# Patient Record
Sex: FEMALE | Race: OTHER | HISPANIC OR LATINO | Employment: FULL TIME | URBAN - METROPOLITAN AREA
[De-identification: names, ages, dates, MRNs, and addresses within clinical notes are randomized per-mention and may not be internally consistent; named-entity substitution may affect disease eponyms.]

---

## 2017-01-31 ENCOUNTER — HOSPITAL ENCOUNTER (EMERGENCY)
Facility: HOSPITAL | Age: 27
Discharge: HOME | End: 2017-01-31
Attending: EMERGENCY MEDICINE | Admitting: EMERGENCY MEDICINE
Payer: COMMERCIAL

## 2017-01-31 ENCOUNTER — APPOINTMENT (EMERGENCY)
Dept: ULTRASOUND IMAGING | Facility: HOSPITAL | Age: 27
End: 2017-01-31
Payer: COMMERCIAL

## 2017-01-31 VITALS
SYSTOLIC BLOOD PRESSURE: 121 MMHG | DIASTOLIC BLOOD PRESSURE: 74 MMHG | RESPIRATION RATE: 16 BRPM | TEMPERATURE: 98.5 F | OXYGEN SATURATION: 98 % | WEIGHT: 142 LBS | HEART RATE: 78 BPM

## 2017-01-31 DIAGNOSIS — O26.899 ABDOMINAL PAIN IN PREGNANCY: Primary | ICD-10-CM

## 2017-01-31 DIAGNOSIS — R10.2 PELVIC PAIN: ICD-10-CM

## 2017-01-31 DIAGNOSIS — R10.9 ABDOMINAL PAIN IN PREGNANCY: Primary | ICD-10-CM

## 2017-01-31 LAB
B-HCG SERPL-ACNC: ABNORMAL MIU/ML
BILIRUB UR QL STRIP: NEGATIVE
CLARITY UR: CLEAR
COLOR UR: YELLOW
COLOR, POC: YELLOW
GLUCOSE UR STRIP-MCNC: NEGATIVE MG/DL
HCG UR QL: POSITIVE
HGB UR QL STRIP.AUTO: NEGATIVE
KETONES UR STRIP-MCNC: NEGATIVE MG/DL
LEUKOCYTE ESTERASE UR QL STRIP: NEGATIVE
NITRITE UR QL STRIP: NEGATIVE
PH UR STRIP.AUTO: 8.5 [PH] (ref 4.5–8)
PROT UR STRIP-MCNC: NEGATIVE MG/DL
SP GR UR STRIP.AUTO: 1.02 (ref 1–1.03)
UROBILINOGEN UR QL STRIP.AUTO: 0.2 E.U./DL

## 2017-01-31 PROCEDURE — 87491 CHLMYD TRACH DNA AMP PROBE: CPT | Performed by: EMERGENCY MEDICINE

## 2017-01-31 PROCEDURE — 87591 N.GONORRHOEAE DNA AMP PROB: CPT | Performed by: EMERGENCY MEDICINE

## 2017-01-31 PROCEDURE — 81025 URINE PREGNANCY TEST: CPT | Performed by: EMERGENCY MEDICINE

## 2017-01-31 PROCEDURE — 36415 COLL VENOUS BLD VENIPUNCTURE: CPT | Performed by: EMERGENCY MEDICINE

## 2017-01-31 PROCEDURE — 81002 URINALYSIS NONAUTO W/O SCOPE: CPT | Performed by: EMERGENCY MEDICINE

## 2017-01-31 PROCEDURE — 99284 EMERGENCY DEPT VISIT MOD MDM: CPT

## 2017-01-31 PROCEDURE — 84702 CHORIONIC GONADOTROPIN TEST: CPT | Performed by: EMERGENCY MEDICINE

## 2017-01-31 PROCEDURE — 81003 URINALYSIS AUTO W/O SCOPE: CPT

## 2017-01-31 PROCEDURE — 76801 OB US < 14 WKS SINGLE FETUS: CPT

## 2017-01-31 RX ORDER — LAMOTRIGINE 100 MG/1
200 TABLET ORAL DAILY
COMMUNITY

## 2017-02-01 LAB
CHLAMYDIA DNA CVX QL NAA+PROBE: NORMAL
N GONORRHOEA DNA GENITAL QL NAA+PROBE: NORMAL

## 2018-02-08 ENCOUNTER — HOSPITAL ENCOUNTER (EMERGENCY)
Facility: HOSPITAL | Age: 28
Discharge: LEFT BEFORE DISCHARGE | End: 2018-02-08
Attending: EMERGENCY MEDICINE

## 2018-02-08 ENCOUNTER — APPOINTMENT (EMERGENCY)
Dept: CT IMAGING | Facility: HOSPITAL | Age: 28
End: 2018-02-08

## 2018-02-08 VITALS
TEMPERATURE: 97.5 F | HEART RATE: 86 BPM | RESPIRATION RATE: 14 BRPM | DIASTOLIC BLOOD PRESSURE: 64 MMHG | OXYGEN SATURATION: 100 % | WEIGHT: 149.91 LBS | SYSTOLIC BLOOD PRESSURE: 103 MMHG

## 2018-02-08 DIAGNOSIS — Z86.73 HISTORY OF STROKE: ICD-10-CM

## 2018-02-08 DIAGNOSIS — G40.909 NONINTRACTABLE EPILEPSY WITHOUT STATUS EPILEPTICUS, UNSPECIFIED EPILEPSY TYPE (HCC): Primary | ICD-10-CM

## 2018-02-08 LAB
ALBUMIN SERPL BCP-MCNC: 4 G/DL (ref 3.5–5)
ALP SERPL-CCNC: 57 U/L (ref 46–116)
ALT SERPL W P-5'-P-CCNC: 29 U/L (ref 12–78)
AMPHETAMINES SERPL QL SCN: NEGATIVE
ANION GAP SERPL CALCULATED.3IONS-SCNC: 21 MMOL/L (ref 4–13)
AST SERPL W P-5'-P-CCNC: 16 U/L (ref 5–45)
BACTERIA UR QL AUTO: ABNORMAL /HPF
BARBITURATES UR QL: NEGATIVE
BASOPHILS # BLD AUTO: 0.02 THOUSANDS/ΜL (ref 0–0.1)
BASOPHILS NFR BLD AUTO: 0 % (ref 0–1)
BENZODIAZ UR QL: NEGATIVE
BILIRUB SERPL-MCNC: 0.17 MG/DL (ref 0.2–1)
BILIRUB UR QL STRIP: NEGATIVE
BUN SERPL-MCNC: 15 MG/DL (ref 5–25)
CALCIUM SERPL-MCNC: 8.9 MG/DL (ref 8.3–10.1)
CHLORIDE SERPL-SCNC: 102 MMOL/L (ref 100–108)
CLARITY UR: CLEAR
CO2 SERPL-SCNC: 16 MMOL/L (ref 21–32)
COCAINE UR QL: NEGATIVE
COLOR UR: YELLOW
CREAT SERPL-MCNC: 0.95 MG/DL (ref 0.6–1.3)
EOSINOPHIL # BLD AUTO: 0.13 THOUSAND/ΜL (ref 0–0.61)
EOSINOPHIL NFR BLD AUTO: 2 % (ref 0–6)
ERYTHROCYTE [DISTWIDTH] IN BLOOD BY AUTOMATED COUNT: 14.1 % (ref 11.6–15.1)
EXT PREG TEST URINE: NEGATIVE
GFR SERPL CREATININE-BSD FRML MDRD: 83 ML/MIN/1.73SQ M
GLUCOSE SERPL-MCNC: 98 MG/DL (ref 65–140)
GLUCOSE UR STRIP-MCNC: NEGATIVE MG/DL
HCT VFR BLD AUTO: 39.3 % (ref 34.8–46.1)
HGB BLD-MCNC: 13 G/DL (ref 11.5–15.4)
HGB UR QL STRIP.AUTO: ABNORMAL
KETONES UR STRIP-MCNC: NEGATIVE MG/DL
LEUKOCYTE ESTERASE UR QL STRIP: ABNORMAL
LYMPHOCYTES # BLD AUTO: 2.5 THOUSANDS/ΜL (ref 0.6–4.47)
LYMPHOCYTES NFR BLD AUTO: 29 % (ref 14–44)
MCH RBC QN AUTO: 29.1 PG (ref 26.8–34.3)
MCHC RBC AUTO-ENTMCNC: 33.1 G/DL (ref 31.4–37.4)
MCV RBC AUTO: 88 FL (ref 82–98)
METHADONE UR QL: NEGATIVE
MONOCYTES # BLD AUTO: 0.48 THOUSAND/ΜL (ref 0.17–1.22)
MONOCYTES NFR BLD AUTO: 6 % (ref 4–12)
NEUTROPHILS # BLD AUTO: 5.57 THOUSANDS/ΜL (ref 1.85–7.62)
NEUTS SEG NFR BLD AUTO: 63 % (ref 43–75)
NITRITE UR QL STRIP: NEGATIVE
NON-SQ EPI CELLS URNS QL MICRO: ABNORMAL /HPF
NRBC BLD AUTO-RTO: 0 /100 WBCS
OPIATES UR QL SCN: NEGATIVE
PCP UR QL: NEGATIVE
PH UR STRIP.AUTO: 5.5 [PH] (ref 4.5–8)
PLATELET # BLD AUTO: 268 THOUSANDS/UL (ref 149–390)
PMV BLD AUTO: 12.1 FL (ref 8.9–12.7)
POTASSIUM SERPL-SCNC: 3.8 MMOL/L (ref 3.5–5.3)
PROT SERPL-MCNC: 8.2 G/DL (ref 6.4–8.2)
PROT UR STRIP-MCNC: ABNORMAL MG/DL
RBC # BLD AUTO: 4.47 MILLION/UL (ref 3.81–5.12)
RBC #/AREA URNS AUTO: ABNORMAL /HPF
SODIUM SERPL-SCNC: 139 MMOL/L (ref 136–145)
SP GR UR STRIP.AUTO: >=1.03 (ref 1–1.03)
THC UR QL: NEGATIVE
UROBILINOGEN UR QL STRIP.AUTO: 0.2 E.U./DL
WBC # BLD AUTO: 8.7 THOUSAND/UL (ref 4.31–10.16)
WBC #/AREA URNS AUTO: ABNORMAL /HPF

## 2018-02-08 PROCEDURE — 80053 COMPREHEN METABOLIC PANEL: CPT | Performed by: NURSE PRACTITIONER

## 2018-02-08 PROCEDURE — 81001 URINALYSIS AUTO W/SCOPE: CPT

## 2018-02-08 PROCEDURE — 81025 URINE PREGNANCY TEST: CPT | Performed by: NURSE PRACTITIONER

## 2018-02-08 PROCEDURE — 80175 DRUG SCREEN QUAN LAMOTRIGINE: CPT | Performed by: NURSE PRACTITIONER

## 2018-02-08 PROCEDURE — 81002 URINALYSIS NONAUTO W/O SCOPE: CPT | Performed by: NURSE PRACTITIONER

## 2018-02-08 PROCEDURE — 36415 COLL VENOUS BLD VENIPUNCTURE: CPT | Performed by: NURSE PRACTITIONER

## 2018-02-08 PROCEDURE — 96360 HYDRATION IV INFUSION INIT: CPT

## 2018-02-08 PROCEDURE — 70450 CT HEAD/BRAIN W/O DYE: CPT

## 2018-02-08 PROCEDURE — 85025 COMPLETE CBC W/AUTO DIFF WBC: CPT | Performed by: NURSE PRACTITIONER

## 2018-02-08 PROCEDURE — 99284 EMERGENCY DEPT VISIT MOD MDM: CPT

## 2018-02-08 PROCEDURE — 80307 DRUG TEST PRSMV CHEM ANLYZR: CPT | Performed by: NURSE PRACTITIONER

## 2018-02-08 RX ADMIN — SODIUM CHLORIDE 1000 ML: 0.9 INJECTION, SOLUTION INTRAVENOUS at 02:50

## 2018-02-08 NOTE — ED NOTES
Patient unable to provide urine sample, aware one is needed, instructed to call when able to provide sample     Otilia Rowan RN  02/08/18 4071

## 2018-02-08 NOTE — ED NOTES
Daniela JOHNS at bedside explaining risk of leaving AMA  Patient verbalized understanding and signed paperwork  Paperwork on patient chart       Ayanna Browning RN  02/08/18 3372

## 2018-02-08 NOTE — ED NOTES
Suction setup at bedside  Patient with HOB elevated at 45 degrees       Kristina Augusta, TOI  02/08/18 9042

## 2018-02-08 NOTE — ED PROVIDER NOTES
History  Chief Complaint   Patient presents with    Seizure - Prior Hx Of     Pt reports " I think I had seizure"  Per EMS family called due to patient "shaking", pt vomited PTA  Pt reports last seizure was in June      This is a 33year old female who has a hx of epilepsy since age 16 and a subacute right maribel stroke and takes lamictal  She states she sees a neurologist at [address] and hasn't had a lamictal level in a long time  She admits to a headache  Pt admits that she was to take aspirin for life but stopped it 1 month ago  She states that her doctor is aware of this  She states she was sleeping and states "I think I had a convulsion"  Pt states she was sleeping and apparently had a seizure, her mother in law was in the house and may have been the one to call EMS  EMS states that pt had vomited when they arrived  EMS stated that pt was post ictal upon arrival  Pt denies incontinence or tongue biting  LMP now  Pt states she does drive  Hx of:   Acute ischemic stroke (HCC)    Basilar artery stenosis    Cervicalgia          History provided by:  Patient and medical records  History limited by:  Acuity of condition   used: Yes    Seizure - Prior Hx Of   Seizure activity on arrival: no    Seizure type:  Unable to specify  Postictal symptoms: confusion, memory loss and somnolence    Severity:  Mild  Duration:  1 hour  Progression:  Unable to specify  History of seizures: yes        Prior to Admission Medications   Prescriptions Last Dose Informant Patient Reported? Taking?   lamoTRIgine (LaMICtal) 100 mg tablet   Yes No   Sig: Take 100 mg by mouth daily      Facility-Administered Medications: None       Past Medical History:   Diagnosis Date    Epilepsy ([facility] )     Myasthenia gravis ([facility] )        Past Surgical History:   Procedure Laterality Date    TOTAL THYMECTOMY         History reviewed  No pertinent family history    I have reviewed and agree with the history as documented  Social History   Substance Use Topics    Smoking status: Never Smoker    Smokeless tobacco: Never Used    Alcohol use No        Review of Systems   Unable to perform ROS: Acuity of condition       Physical Exam  ED Triage Vitals [02/08/18 0215]   Temperature Pulse Respirations Blood Pressure SpO2   97 5 °F (36 4 °C) 103 14 112/69 98 %      Temp Source Heart Rate Source Patient Position - Orthostatic VS BP Location FiO2 (%)   Oral Monitor Lying Right arm --      Pain Score       9           Orthostatic Vital Signs  Vitals:    02/08/18 0230 02/08/18 0245 02/08/18 0300 02/08/18 0330   BP:   97/61 103/64   Pulse: 98 92 92 86   Patient Position - Orthostatic VS:           Physical Exam   Constitutional: She is oriented to person, place, and time  She appears well-developed and well-nourished  No distress  Pt is very somnolent    HENT:   Head: Normocephalic and atraumatic  Right Ear: External ear normal    Left Ear: External ear normal    Nose: Nose normal    Mouth/Throat: Oropharynx is clear and moist  No oropharyngeal exudate  Eyes: EOM are normal  Pupils are equal, round, and reactive to light  4mm B/L    Neck: Normal range of motion  Neck supple  Cardiovascular: Normal rate, regular rhythm and normal heart sounds  Pulmonary/Chest: Effort normal and breath sounds normal    Abdominal: Soft  Bowel sounds are normal  There is tenderness  Musculoskeletal: Normal range of motion  Neurological: She is alert and oriented to person, place, and time  She displays normal reflexes  No cranial nerve deficit or sensory deficit  She exhibits normal muscle tone  Coordination normal    Skin: Skin is warm and dry  Capillary refill takes less than 2 seconds  She is not diaphoretic  Psychiatric: She has a normal mood and affect  Her behavior is normal  Judgment and thought content normal    Nursing note and vitals reviewed        ED Medications  Medications   sodium chloride 0 9 % bolus 1,000 mL (0 mL Intravenous Stopped 2/8/18 0403)       Diagnostic Studies  Results Reviewed     Procedure Component Value Units Date/Time    Rapid drug screen, urine [[id number]]  (Normal) Collected:  02/08/18 0331    Lab Status:  Final result Specimen:  Urine from Urine, Clean Catch Updated:  02/08/18 0349     Amph/Meth UR Negative     Barbiturate Ur Negative     Benzodiazepine Urine Negative     Cocaine Urine Negative     Methadone Urine Negative     Opiate Urine Negative     PCP Ur Negative     THC Urine Negative    Narrative:         FOR MEDICAL PURPOSES ONLY  IF CONFIRMATION NEEDED PLEASE CONTACT THE LAB WITHIN 5 DAYS      Drug Screen Cutoff Levels:  AMPHETAMINE/METHAMPHETAMINES  1000 ng/mL  BARBITURATES     200 ng/mL  BENZODIAZEPINES     200 ng/mL  COCAINE      300 ng/mL  METHADONE      300 ng/mL  OPIATES      300 ng/mL  PHENCYCLIDINE     25 ng/mL  THC       50 ng/mL    Urine Microscopic [[id number]]  (Abnormal) Collected:  02/08/18 0329    Lab Status:  Final result Specimen:  Urine from Urine, Clean Catch Updated:  02/08/18 0344     RBC, UA 4-10 (A) /hpf      WBC, UA 2-4 (A) /hpf      Epithelial Cells Occasional /hpf      Bacteria, UA Occasional /hpf     POCT urinalysis dipstick [[id number]]  (Abnormal) Resulted:  02/08/18 0332    Lab Status:  Final result Specimen:  Urine Updated:  02/08/18 0332    POCT pregnancy, urine [[id number]]  (Normal) Resulted:  02/08/18 0332    Lab Status:  Final result Updated:  02/08/18 0332     EXT PREG TEST UR (Ref: Negative) negative    ED Urine Macroscopic [[id number]]  (Abnormal) Collected:  02/08/18 0329    Lab Status:  Final result Specimen:  Urine Updated:  02/08/18 0329     Color, UA Yellow     Clarity, UA Clear     pH, UA 5 5     Leukocytes, UA Trace (A)     Nitrite, UA Negative     Protein, UA 30 (1+) (A) mg/dl      Glucose, UA Negative mg/dl      Ketones, UA Negative mg/dl      Urobilinogen, UA 0 2 E U /dl      Bilirubin, UA Negative     Blood, UA Large (A)     Specific Gravity, UA >=1 030 Narrative:       CLINITEK RESULT    Comprehensive metabolic panel [[id number]]  (Abnormal) Collected:  02/08/18 0243    Lab Status:  Final result Specimen:  Blood from Arm, Left Updated:  02/08/18 0303     Sodium 139 mmol/L      Potassium 3 8 mmol/L      Chloride 102 mmol/L      CO2 16 (L) mmol/L      Anion Gap 21 (H) mmol/L      BUN 15 mg/dL      Creatinine 0 95 mg/dL      Glucose 98 mg/dL      Calcium 8 9 mg/dL      AST 16 U/L      ALT 29 U/L      Alkaline Phosphatase 57 U/L      Total Protein 8 2 g/dL      Albumin 4 0 g/dL      Total Bilirubin 0 17 (L) mg/dL      eGFR 83 ml/min/1 73sq m     Narrative:         National Kidney Disease Education Program recommendations are as follows:  GFR calculation is accurate only with a steady state creatinine  Chronic Kidney disease less than 60 ml/min/1 73 sq  meters  Kidney failure less than 15 ml/min/1 73 sq  meters  CBC and differential [[id number]]  (Normal) Collected:  02/08/18 0243    Lab Status:  Final result Specimen:  Blood from Arm, Left Updated:  02/08/18 0248     WBC 8 70 Thousand/uL      RBC 4 47 Million/uL      Hemoglobin 13 0 g/dL      Hematocrit 39 3 %      MCV 88 fL      MCH 29 1 pg      MCHC 33 1 g/dL      RDW 14 1 %      MPV 12 1 fL      Platelets 539 Thousands/uL      nRBC 0 /100 WBCs      Neutrophils Relative 63 %      Lymphocytes Relative 29 %      Monocytes Relative 6 %      Eosinophils Relative 2 %      Basophils Relative 0 %      Neutrophils Absolute 5 57 Thousands/µL      Lymphocytes Absolute 2 50 Thousands/µL      Monocytes Absolute 0 48 Thousand/µL      Eosinophils Absolute 0 13 Thousand/µL      Basophils Absolute 0 02 Thousands/µL     Lamotrigine level [[id number]] Collected:  02/08/18 0243    Lab Status: In process Specimen:  Blood from Arm, Left Updated:  02/08/18 0245                 CT head without contrast   Final Result by Lamon Curling, MD (02/08 0255)      No acute intracranial hemorrhage, midline shift, or mass effect           Workstation performed: [id number]                    Procedures  Procedures       Phone Contacts  ED Phone Contact    ED Course  ED Course as of Feb 08 0405   u Feb 08, 2018   0306 Labs reviewed and unremarkable  CT head negative  I believe that pt warrants and MRI of the head to rule out any further CVA due to stopping aspirin 1 month ago and now a seizure as last one was in June 2017 0317 Waiting on urine Pt has had no further seizures while in ED      0354 I have discussed all labs and radiology results with pt  I have informed her that she would benefit from an MRI as last stroke was found on MRI and being that she has stopped aspirin she should have further testing  Pt is refusing to stay for MRI  I have also offered pt to be transferred to [facility] for further care since neurologist is there and she refuses  Pt signs out AMA  I have explained to pt that she could have a stroke, debilitating physical condition, neuro deficits or death  Pt verbalizes understanding                                   MDM  Number of Diagnoses or Management Options  Diagnosis management comments: Differential diagnosis:  Epilepsy history - seizure    Labs  uds  ua hcg  ua  IVF  CT head            Amount and/or Complexity of Data Reviewed  Clinical lab tests: ordered and reviewed  Tests in the radiology section of CPT®: ordered and reviewed  Review and summarize past medical records: yes      CritCare Time    Disposition  Final diagnoses:   Nonintractable epilepsy without status epilepticus, unspecified epilepsy type ([facility] )   History of stroke     Time reflects when diagnosis was documented in both MDM as applicable and the Disposition within this note     Time User Action Codes Description Comment    2/8/2018  4:01 AM Kvng Davison Add [G40 909] Nonintractable epilepsy without status epilepticus, unspecified epilepsy type ([facility] )     2/8/2018  4:02 AM Kvng Davison Add [Z86 73] History of stroke       ED Disposition     ED Disposition Condition Comment    AMA    Pt left AMA  All risk factors have been discussed with pt (see notes)  Pt signed AMA form          Follow-up Information     Follow up With Specialties Details Why Contact Info Additional Information       Follow up with your PCP at [facility]         Follow up with your Neurologist at [place] [address] Emergency Department Emergency Medicine  If symptoms worsen [address] [facility]  [phone] [place] ED, [address] [facility] [address]  , [place], [place], [address]        Patient's Medications   Discharge Prescriptions    No medications on file     No discharge procedures on file      ED Provider  Electronically Signed by           Kristi Bethea  02/08/18 8432

## 2018-02-11 LAB — LAMOTRIGINE SERPL-MCNC: 1.7 UG/ML (ref 2–20)

## 2019-08-27 ENCOUNTER — HOSPITAL ENCOUNTER (EMERGENCY)
Facility: HOSPITAL | Age: 29
Discharge: HOME | End: 2019-08-27
Attending: EMERGENCY MEDICINE | Admitting: EMERGENCY MEDICINE

## 2019-08-27 ENCOUNTER — OFFICE VISIT (OUTPATIENT)
Dept: OBGYN CLINIC | Facility: MEDICAL CENTER | Age: 29
End: 2019-08-27

## 2019-08-27 ENCOUNTER — APPOINTMENT (EMERGENCY)
Dept: RADIOLOGY | Facility: HOSPITAL | Age: 29
End: 2019-08-27

## 2019-08-27 VITALS
WEIGHT: 149 LBS | HEIGHT: 64 IN | BODY MASS INDEX: 25.44 KG/M2 | DIASTOLIC BLOOD PRESSURE: 70 MMHG | SYSTOLIC BLOOD PRESSURE: 111 MMHG | HEART RATE: 69 BPM

## 2019-08-27 VITALS
DIASTOLIC BLOOD PRESSURE: 71 MMHG | WEIGHT: 150.13 LBS | OXYGEN SATURATION: 99 % | RESPIRATION RATE: 17 BRPM | HEART RATE: 87 BPM | TEMPERATURE: 97.9 F | SYSTOLIC BLOOD PRESSURE: 113 MMHG

## 2019-08-27 DIAGNOSIS — R56.9 SEIZURE (HCC): ICD-10-CM

## 2019-08-27 DIAGNOSIS — S42.102A CLOSED FRACTURE OF LEFT SCAPULA, UNSPECIFIED PART OF SCAPULA, INITIAL ENCOUNTER: Primary | ICD-10-CM

## 2019-08-27 DIAGNOSIS — S42.102A LEFT SCAPULA FRACTURE: Primary | ICD-10-CM

## 2019-08-27 LAB
ALBUMIN SERPL BCP-MCNC: 3.6 G/DL (ref 3.5–5)
ALP SERPL-CCNC: 46 U/L (ref 46–116)
ALT SERPL W P-5'-P-CCNC: 16 U/L (ref 12–78)
ANION GAP SERPL CALCULATED.3IONS-SCNC: 11 MMOL/L (ref 4–13)
AST SERPL W P-5'-P-CCNC: 17 U/L (ref 5–45)
BASOPHILS # BLD AUTO: 0.02 THOUSANDS/ΜL (ref 0–0.1)
BASOPHILS NFR BLD AUTO: 0 % (ref 0–1)
BILIRUB SERPL-MCNC: <0.1 MG/DL (ref 0.2–1)
BILIRUB UR QL STRIP: NEGATIVE
BUN SERPL-MCNC: 15 MG/DL (ref 5–25)
CALCIUM SERPL-MCNC: 8.9 MG/DL (ref 8.3–10.1)
CHLORIDE SERPL-SCNC: 104 MMOL/L (ref 100–108)
CLARITY UR: NORMAL
CO2 SERPL-SCNC: 23 MMOL/L (ref 21–32)
COLOR UR: YELLOW
CREAT SERPL-MCNC: 0.8 MG/DL (ref 0.6–1.3)
EOSINOPHIL # BLD AUTO: 0.16 THOUSAND/ΜL (ref 0–0.61)
EOSINOPHIL NFR BLD AUTO: 3 % (ref 0–6)
ERYTHROCYTE [DISTWIDTH] IN BLOOD BY AUTOMATED COUNT: 12.5 % (ref 11.6–15.1)
EXT PREG TEST URINE: NORMAL
EXT. CONTROL ED NAV: NORMAL
GFR SERPL CREATININE-BSD FRML MDRD: 101 ML/MIN/1.73SQ M
GLUCOSE SERPL-MCNC: 84 MG/DL (ref 65–140)
GLUCOSE UR STRIP-MCNC: NEGATIVE MG/DL
HCT VFR BLD AUTO: 39.8 % (ref 34.8–46.1)
HGB BLD-MCNC: 12.7 G/DL (ref 11.5–15.4)
HGB UR QL STRIP.AUTO: NEGATIVE
IMM GRANULOCYTES # BLD AUTO: 0.02 THOUSAND/UL (ref 0–0.2)
IMM GRANULOCYTES NFR BLD AUTO: 0 % (ref 0–2)
KETONES UR STRIP-MCNC: NEGATIVE MG/DL
LEUKOCYTE ESTERASE UR QL STRIP: NEGATIVE
LYMPHOCYTES # BLD AUTO: 1.51 THOUSANDS/ΜL (ref 0.6–4.47)
LYMPHOCYTES NFR BLD AUTO: 28 % (ref 14–44)
MCH RBC QN AUTO: 29.6 PG (ref 26.8–34.3)
MCHC RBC AUTO-ENTMCNC: 31.9 G/DL (ref 31.4–37.4)
MCV RBC AUTO: 93 FL (ref 82–98)
MONOCYTES # BLD AUTO: 0.45 THOUSAND/ΜL (ref 0.17–1.22)
MONOCYTES NFR BLD AUTO: 8 % (ref 4–12)
NEUTROPHILS # BLD AUTO: 3.3 THOUSANDS/ΜL (ref 1.85–7.62)
NEUTS SEG NFR BLD AUTO: 61 % (ref 43–75)
NITRITE UR QL STRIP: NEGATIVE
NRBC BLD AUTO-RTO: 0 /100 WBCS
PH UR STRIP.AUTO: 6 [PH]
PLATELET # BLD AUTO: 210 THOUSANDS/UL (ref 149–390)
PMV BLD AUTO: 12.2 FL (ref 8.9–12.7)
POTASSIUM SERPL-SCNC: 3.9 MMOL/L (ref 3.5–5.3)
PROT SERPL-MCNC: 7.9 G/DL (ref 6.4–8.2)
PROT UR STRIP-MCNC: NEGATIVE MG/DL
RBC # BLD AUTO: 4.29 MILLION/UL (ref 3.81–5.12)
SODIUM SERPL-SCNC: 138 MMOL/L (ref 136–145)
SP GR UR STRIP.AUTO: >=1.03 (ref 1–1.03)
UROBILINOGEN UR QL STRIP.AUTO: 0.2 E.U./DL
WBC # BLD AUTO: 5.46 THOUSAND/UL (ref 4.31–10.16)

## 2019-08-27 PROCEDURE — 99203 OFFICE O/P NEW LOW 30 MIN: CPT | Performed by: ORTHOPAEDIC SURGERY

## 2019-08-27 PROCEDURE — 81025 URINE PREGNANCY TEST: CPT | Performed by: EMERGENCY MEDICINE

## 2019-08-27 PROCEDURE — 96374 THER/PROPH/DIAG INJ IV PUSH: CPT

## 2019-08-27 PROCEDURE — 80053 COMPREHEN METABOLIC PANEL: CPT | Performed by: EMERGENCY MEDICINE

## 2019-08-27 PROCEDURE — 99284 EMERGENCY DEPT VISIT MOD MDM: CPT

## 2019-08-27 PROCEDURE — 96361 HYDRATE IV INFUSION ADD-ON: CPT

## 2019-08-27 PROCEDURE — 81003 URINALYSIS AUTO W/O SCOPE: CPT | Performed by: EMERGENCY MEDICINE

## 2019-08-27 PROCEDURE — 73030 X-RAY EXAM OF SHOULDER: CPT

## 2019-08-27 PROCEDURE — 85025 COMPLETE CBC W/AUTO DIFF WBC: CPT | Performed by: EMERGENCY MEDICINE

## 2019-08-27 PROCEDURE — 80175 DRUG SCREEN QUAN LAMOTRIGINE: CPT | Performed by: EMERGENCY MEDICINE

## 2019-08-27 PROCEDURE — 99284 EMERGENCY DEPT VISIT MOD MDM: CPT | Performed by: EMERGENCY MEDICINE

## 2019-08-27 PROCEDURE — 36415 COLL VENOUS BLD VENIPUNCTURE: CPT | Performed by: EMERGENCY MEDICINE

## 2019-08-27 RX ORDER — KETOROLAC TROMETHAMINE 30 MG/ML
30 INJECTION, SOLUTION INTRAMUSCULAR; INTRAVENOUS ONCE
Status: COMPLETED | OUTPATIENT
Start: 2019-08-27 | End: 2019-08-27

## 2019-08-27 RX ORDER — TRAMADOL HYDROCHLORIDE 50 MG/1
50 TABLET ORAL EVERY 6 HOURS PRN
Qty: 20 TABLET | Refills: 0 | Status: SHIPPED | OUTPATIENT
Start: 2019-08-27 | End: 2019-09-01

## 2019-08-27 RX ORDER — ACETAMINOPHEN 325 MG/1
650 TABLET ORAL ONCE
Status: COMPLETED | OUTPATIENT
Start: 2019-08-27 | End: 2019-08-27

## 2019-08-27 RX ORDER — NAPROXEN 500 MG/1
500 TABLET ORAL 2 TIMES DAILY PRN
Qty: 20 TABLET | Refills: 0 | OUTPATIENT
Start: 2019-08-27 | End: 2020-07-28

## 2019-08-27 RX ADMIN — ACETAMINOPHEN 650 MG: 325 TABLET ORAL at 01:02

## 2019-08-27 RX ADMIN — SODIUM CHLORIDE 1000 ML: 0.9 INJECTION, SOLUTION INTRAVENOUS at 00:57

## 2019-08-27 RX ADMIN — KETOROLAC TROMETHAMINE 30 MG: 30 INJECTION, SOLUTION INTRAMUSCULAR at 01:03

## 2019-08-27 NOTE — ED PROVIDER NOTES
History  Chief Complaint   Patient presents with    Seizure - Prior Hx Of     pt had a seizure and her arm was under her body while seizing   Pt reports L arm pain and headache  31 yo female with hx of seizures who is compliant with her lamictal 200mg QD and tonight while sleeping had witnessed tonic clonic seizure for a minute and was post ictal, 911 called  Pt c/o mild headache and L shoulder pain "I was laying on it during the seizure"  History provided by:  Patient and EMS personnel   used: No    Seizure - Prior Hx Of   Seizure activity on arrival: no    Seizure type:  Grand mal  Preceding symptoms comment:  None - was sleeping  Initial focality:  None  Episode characteristics: generalized shaking    Return to baseline: yes    Severity:  Moderate  Timing:  Once  Number of seizures this episode:  1  Progression:  Resolved  Recent head injury:  No recent head injuries  PTA treatment:  None  History of seizures: yes        Prior to Admission Medications   Prescriptions Last Dose Informant Patient Reported? Taking?   lamoTRIgine (LaMICtal) 100 mg tablet   Yes No   Sig: Take 200 mg by mouth daily       Facility-Administered Medications: None       Past Medical History:   Diagnosis Date    Epilepsy ([facility] )     Myasthenia gravis ([facility] )        Past Surgical History:   Procedure Laterality Date    TOTAL THYMECTOMY         History reviewed  No pertinent family history  I have reviewed and agree with the history as documented  Social History     Tobacco Use    Smoking status: Never Smoker    Smokeless tobacco: Never Used   Substance Use Topics    Alcohol use: No    Drug use: No        Review of Systems   Constitutional: Negative for appetite change, chills, fatigue and fever  HENT: Negative for sore throat  Eyes: Negative for visual disturbance  Respiratory: Negative for shortness of breath  Cardiovascular: Negative for chest pain     Gastrointestinal: Negative for abdominal pain, diarrhea, nausea and vomiting  Genitourinary: Negative for dysuria, frequency, vaginal bleeding and vaginal discharge  Musculoskeletal: Positive for arthralgias (L post shoulder)  Negative for back pain, neck pain and neck stiffness  Skin: Negative for pallor and rash  Allergic/Immunologic: Negative for immunocompromised state  Neurological: Positive for seizures  Negative for dizziness, tremors, syncope, facial asymmetry, speech difficulty, light-headedness, numbness and headaches  Psychiatric/Behavioral: Negative for confusion  All other systems reviewed and are negative  Physical Exam  Physical Exam   Constitutional: She is oriented to person, place, and time  She appears well-developed and well-nourished  She appears distressed (uncomfortable)  HENT:   Head: Normocephalic and atraumatic  Right Ear: External ear normal    Left Ear: External ear normal    Mouth/Throat: Oropharynx is clear and moist    Eyes: Pupils are equal, round, and reactive to light  Conjunctivae and EOM are normal    Neck: Normal range of motion  Neck supple  Cardiovascular: Normal rate and regular rhythm  No murmur heard  Pulmonary/Chest: Effort normal and breath sounds normal  No respiratory distress  Abdominal: Soft  Bowel sounds are normal    Musculoskeletal: She exhibits tenderness (L post shoulder pain, decreased ROM due to pain - NV intact distally)  Neurological: She is alert and oriented to person, place, and time  She displays normal reflexes  She exhibits normal muscle tone  Coordination normal    Skin: Skin is warm  No rash noted  No pallor  Psychiatric: She has a normal mood and affect  Her behavior is normal    Nursing note and vitals reviewed        Vital Signs  ED Triage Vitals [08/27/19 0036]   Temperature Pulse Respirations Blood Pressure SpO2   97 9 °F (36 6 °C) 87 17 113/71 99 %      Temp Source Heart Rate Source Patient Position - Orthostatic VS BP Location FiO2 (%)   Oral Monitor Lying Right arm --      Pain Score       Worst Possible Pain           Vitals:    08/27/19 0036   BP: 113/71   Pulse: 87   Patient Position - Orthostatic VS: Lying         Visual Acuity  Visual Acuity      Most Recent Value   L Pupil Size (mm)  3   R Pupil Size (mm)  3          ED Medications  Medications   sodium chloride 0 9 % bolus 1,000 mL (0 mL Intravenous Stopped 8/27/19 0200)   ketorolac (TORADOL) injection 30 mg (30 mg Intravenous Given 8/27/19 0103)   acetaminophen (TYLENOL) tablet 650 mg (650 mg Oral Given 8/27/19 0102)       Diagnostic Studies  Results Reviewed     Procedure Component Value Units Date/Time    Comprehensive metabolic panel [[id number]]  (Abnormal) Collected:  08/27/19 0056    Lab Status:  Final result Specimen:  Blood from Arm, Right Updated:  08/27/19 0134     Sodium 138 mmol/L      Potassium 3 9 mmol/L      Chloride 104 mmol/L      CO2 23 mmol/L      ANION GAP 11 mmol/L      BUN 15 mg/dL      Creatinine 0 80 mg/dL      Glucose 84 mg/dL      Calcium 8 9 mg/dL      AST 17 U/L      ALT 16 U/L      Alkaline Phosphatase 46 U/L      Total Protein 7 9 g/dL      Albumin 3 6 g/dL      Total Bilirubin <0 10 mg/dL      eGFR 101 ml/min/1 73sq m     Narrative:       [place] guidelines for Chronic Kidney Disease (CKD):     Stage 1 with normal or high GFR (GFR > 90 mL/min/1 73 square meters)    Stage 2 Mild CKD (GFR = 60-89 mL/min/1 73 square meters)    Stage 3A Moderate CKD (GFR = 45-59 mL/min/1 73 square meters)    Stage 3B Moderate CKD (GFR = 30-44 mL/min/1 73 square meters)    Stage 4 Severe CKD (GFR = 15-29 mL/min/1 73 square meters)    Stage 5 End Stage CKD (GFR <15 mL/min/1 73 square meters)  Note: GFR calculation is accurate only with a steady state creatinine    UA w Reflex to Microscopic w Reflex to Culture [[id number]] Collected:  08/27/19 0055    Lab Status:  Final result Specimen:  Urine, Clean Catch Updated:  08/27/19 0103     Color, UA Yellow Clarity, UA Slightly Cloudy     Specific Gravity, UA >=1 030     pH, UA 6 0     Leukocytes, UA Negative     Nitrite, UA Negative     Protein, UA Negative mg/dl      Glucose, UA Negative mg/dl      Ketones, UA Negative mg/dl      Urobilinogen, UA 0 2 E U /dl      Bilirubin, UA Negative     Blood, UA Negative    CBC and differential [[id number]] Collected:  08/27/19 0056    Lab Status:  Final result Specimen:  Blood from Arm, Right Updated:  08/27/19 0101     WBC 5 46 Thousand/uL      RBC 4 29 Million/uL      Hemoglobin 12 7 g/dL      Hematocrit 39 8 %      MCV 93 fL      MCH 29 6 pg      MCHC 31 9 g/dL      RDW 12 5 %      MPV 12 2 fL      Platelets 729 Thousands/uL      nRBC 0 /100 WBCs      Neutrophils Relative 61 %      Immat GRANS % 0 %      Lymphocytes Relative 28 %      Monocytes Relative 8 %      Eosinophils Relative 3 %      Basophils Relative 0 %      Neutrophils Absolute 3 30 Thousands/µL      Immature Grans Absolute 0 02 Thousand/uL      Lymphocytes Absolute 1 51 Thousands/µL      Monocytes Absolute 0 45 Thousand/µL      Eosinophils Absolute 0 16 Thousand/µL      Basophils Absolute 0 02 Thousands/µL     Lamotrigine level [[id number]] Collected:  08/27/19 0056    Lab Status: In process Specimen:  Blood from Arm, Right Updated:  08/27/19 0058    POCT pregnancy, urine [[id number]]  (Normal) Resulted:  08/27/19 0055    Lab Status:  Final result Updated:  08/27/19 0055     EXT PREG TEST UR (Ref: Negative) neg     Control valid                 XR shoulder 2+ views LEFT   Final Result by Patricia Lang MD (08/27 0125)      There is fracture of the superior aspect left scapula  Orthopedic consultation is recommended  The images are available for clinical review               I personally discussed this study with [address] [place] [address] on 8/27/2019 at 1:22 AM                      Workstation performed: [id number]                    Procedures  Procedures       ED Course  ED Course as of Aug 27 0206   Tue Aug 27, 2019 0035 Pt seen and examined  29 yo female with hx of seizures who is compliant with her lamictal 200mg QD and tonight while sleeping had witnessed tonic clonic seizure for a minute and was post ictal, 911 called  Pt c/o mild headache and L shoulder pain "I was laying on it during the seizure"  Will give IVF, toradol, tylenol and check labs including lamictal level for neuro follow up, urine and xay L shoulder  0110 Appears to have fractured superior border of scapula - will get official read  0138 Xray L shoulder - There is fracture of the superior aspect left scapula  Orthopedic consultation is recommended  Will place in shoulder immobilizer and have her f/u with ortho  Pt much more comfortable after toradol and tylenol  MDM    Disposition  Final diagnoses:   Left scapula fracture   Seizure ([address] )     Time reflects when diagnosis was documented in both MDM as applicable and the Disposition within this note     Time User Action Codes Description Comment    8/27/2019  1:41 AM Darell YEPEZ Add [S42 102A] Left scapula fracture     8/27/2019  1:41 AM Darell YEPEZ Add [R56 9] Seizure [facility])       ED Disposition     ED Disposition Condition Date/Time Comment    Discharge Stable Tue Aug 27, 2019  1:41 AM Irene Harris discharge to home/self care              Follow-up Information     Follow up With Specialties Details Why [address] [facility] Neurology Schedule an appointment as soon as possible for a visit  to discuss adding new seizure medication [address] [place] [facility], MD Orthopedic Surgery Schedule an appointment as soon as possible for a visit   [address]   [facility], [address]  [phone]            Discharge Medication List as of 8/27/2019  1:44 AM      CONTINUE these medications which have NOT CHANGED    Details   lamoTRIgine (LaMICtal) 100 mg tablet Take 200 mg by mouth daily , Historical Med           No discharge procedures on file      ED Provider  Electronically Signed by           Juve Jimenez DO  08/27/19 4103

## 2019-08-27 NOTE — LETTER
August 27, 2019     Patient: Irving todd [address] 49   YOB: 1990   Date of Visit: 8/27/2019       To Whom it May Concern: Ronny Lehman is under my professional care  She was seen in my office on 8/27/2019  Due to a recent injury, she is unable to work at this time  She will require assistance for her activities of daily living as well as caring for her small child  The father of the child is available but out of the country  If you have any questions or concerns, please don't hesitate to call           Sincerely,          Mercedes Ramirez MD        CC: No Recipients
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
## 2019-08-29 LAB — LAMOTRIGINE SERPL-MCNC: 1.8 UG/ML (ref 2–20)

## 2019-08-30 NOTE — RESULT ENCOUNTER NOTE
Called and informed patient of subtherapeutic lamotrigine level  Instructed follow up  Patient agreeable

## 2020-07-28 ENCOUNTER — HOSPITAL ENCOUNTER (EMERGENCY)
Facility: HOSPITAL | Age: 30
Discharge: HOME | End: 2020-07-28
Attending: EMERGENCY MEDICINE | Admitting: EMERGENCY MEDICINE
Payer: COMMERCIAL

## 2020-07-28 ENCOUNTER — APPOINTMENT (EMERGENCY)
Dept: RADIOLOGY | Facility: HOSPITAL | Age: 30
End: 2020-07-28
Payer: COMMERCIAL

## 2020-07-28 VITALS
RESPIRATION RATE: 16 BRPM | OXYGEN SATURATION: 100 % | TEMPERATURE: 99.9 F | HEART RATE: 98 BPM | DIASTOLIC BLOOD PRESSURE: 73 MMHG | SYSTOLIC BLOOD PRESSURE: 119 MMHG
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
## 2020-07-28 DIAGNOSIS — S40.022A CONTUSION OF LEFT UPPER EXTREMITY, INITIAL ENCOUNTER: ICD-10-CM

## 2020-07-28 PROCEDURE — 73060 X-RAY EXAM OF HUMERUS: CPT

## 2020-07-28 PROCEDURE — 99283 EMERGENCY DEPT VISIT LOW MDM: CPT | Performed by: EMERGENCY MEDICINE

## 2020-07-28 PROCEDURE — 99284 EMERGENCY DEPT VISIT MOD MDM: CPT

## 2020-07-28 RX ORDER — NAPROXEN 500 MG/1
500 TABLET ORAL EVERY 12 HOURS PRN
Qty: 15 TABLET | Refills: 0 | Status: SHIPPED | OUTPATIENT
Start: 2020-07-28

## 2020-07-28 RX ORDER — IBUPROFEN 400 MG/1
400 TABLET ORAL ONCE
Status: COMPLETED | OUTPATIENT
Start: 2020-07-28 | End: 2020-07-28

## 2020-07-28 RX ADMIN — IBUPROFEN 400 MG: 400 TABLET ORAL at 13:48
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
## 2020-07-28 NOTE — DISCHARGE INSTRUCTIONS
Contusión en adultos   LO QUE NECESITA SABER:   Ian Leader contusión es un moretón que aparece en la piel después de karl Driss Karina  Un moretón se forma cuando se rompen los vasos sanguíneos pequeños, elvin no se rompe la piel  Cuando los vasos sanguíneos se desgarran, la alin se filtra a los tejidos cercanos, janneth los tejidos blandos o los músculos  INSTRUCCIONES SOBRE EL KHURRAM HOSPITALARIA:   Regrese a la mary de emergencias si:   · Le surge dificultad para  el área lesionada  · Usted tiene hormigueo o entumecimiento en el área lesionada o cerca de [place]  · El área de silva mano o pie que se encuentra debajo del moretón se pone fría o pálida  Pregúntele a silva [place] vitaminas y minerales son adecuados para usted  · Usted encuentra un bulto nuevo en el área lesionada  · Ritu síntomas no mejoran después de 4 ó 5 días de [place]  · Usted tiene preguntas o inquietudes acerca de silva condición o cuidado  Medicamentos:  Es posible que usted necesite alguno de los siguientes:  · AINEs (Analgésicos antiinflamatorios no esteroides)  pueden disminuir la inflamación y el dolor o la fiebre  Dora medicamento esta disponible con o sin karl receta médica  Los AINEs pueden causar sangrado estomacal o problemas renales en ciertas personas  Si usted purnima un medicamento anticoagulante, siempre pregúntele a silva médico si los ELIANA son seguros para usted  Siempre ayah la etiqueta de dora medicamento y [place] instrucciones  · Un medicamento con receta para el dolor  podrían ser Lendell Pitch  No espere a que el dolor sea muy intenso para sonu el medicamento  · [place] ritu medicamentos janneth se le haya indicado  Consulte con silva médico si usted zachary que silva medicamento no le está ayudando o si presenta efectos secundarios  Infórmele si es alérgico a algún medicamento  Mantenga karl lista actualizada de los [place], las vitaminas y los productos herbales que purnima   Incluya los siguientes datos de los medicamentos: cantidad, frecuencia y motivo de administración  Traiga con usted la lista o los envases de la píldoras a steve citas de seguimiento  Lleve la lista de los medicamentos con usted en [place] de karl emergencia  Acuda a steve consultas de control con silva médico según le indicaron  Es posible que deba regresar dentro de karl semana para que le vuelvan a revisar la lesión  Anote steve preguntas para que se acuerde de hacerlas dennis steve visitas  Ayude a que silva contusión sane:   · [address] menos que de [facility]  Si a usted le salieron moretones en silva pierna o pie, es posible que deba usar muletas o un bastón para caminar  [place] le ayudará a no apoyarse en la parte lesionada del cuerpo  · Aplique hielo  para bajar la inflamación y calmar el dolor  El hielo también puede contribuir a evitar el daño de los tejidos  Use un paquete de hielo o ponga hielo molido dentro de [employer]  Cubra el hielo con karl toalla y colóquelo sobre el moretón dennis 15 a 20 minutos cada hora o según le indiquen  · Use compresión  para apoyar Mohsen Brighter y disminuir la hinchazón  Coloque un vendaje elástico alrededor de la henrik sobre el músculo lesionado  Asegúrese de que el vendaje no quede demasiado apretado  Se debería poder meter 1 dedo entre el vendaje y la piel  · Eleve la parte lesionada de silva cuerpo  por encima del nivel de silva corazón para ayudar a aliviar el dolor y la inflamación  Use almohadas, cobijas o toallas enrolladas para elevar el área tan a menudo janneth sea posible  · No consuma alcohol  según las indicaciones  El alcohol puede retardar la curación  · No estire los músculos lesionados  inmediatamente después de la lesión  Pregunte a silva médico cuándo y cómo se puede estirar con precaución después de silva lesión  Los estiramientos suaves pueden ayudar a aumentar la flexibilidad  · No masajee el área ni utilice almohadillas térmicas  en la contusión inmediatamente después de la lesión   El calor y los masajes podrían retrasar la sanación  [place] médico podría indicarle que aplique calor después de varios días  En malena momento, el calor comenzará a servir para sanar la lesión  Evite otra contusión:   · Estírese y [place] en calor antes de practicar deportes o hacer ejercicio  · Use equipo de protección cuando practique deportes  Ross Ripper son Drena Minder y las prendas [address] [facility]  · Si comienza a hacer karl nueva actividad física, empiece poco a poco para darle tiempo al cuerpo de acostumbrarse  © 2017 [address] Information is for End User's use only and may not be sold, redistributed or otherwise used for commercial purposes  All illustrations and images included in CareNotes® are the copyrighted property of A D A M , Inc  or Cliff Bolaños  Esta información es sólo para uso en educación  Roach intención no es darle un consejo médico sobre enfermedades o tratamientos  Colsulte con roach Gaylyn Harsh farmacéutico antes de seguir cualquier régimen médico para saber si es seguro y efectivo para usted

## 2023-11-20 ENCOUNTER — HOSPITAL ENCOUNTER (EMERGENCY)
Facility: HOSPITAL | Age: 33
Discharge: HOME | End: 2023-11-20
Attending: EMERGENCY MEDICINE
Payer: COMMERCIAL

## 2023-11-20 ENCOUNTER — APPOINTMENT (EMERGENCY)
Dept: RADIOLOGY | Facility: HOSPITAL | Age: 33
End: 2023-11-20
Payer: COMMERCIAL

## 2023-11-20 VITALS
BODY MASS INDEX: 27.17 KG/M2 | OXYGEN SATURATION: 100 % | TEMPERATURE: 98.6 F | WEIGHT: 158.29 LBS | HEART RATE: 71 BPM | RESPIRATION RATE: 16 BRPM

## 2023-11-20 DIAGNOSIS — S92.534A CLOSED NONDISPLACED FRACTURE OF DISTAL PHALANX OF LESSER TOE OF RIGHT FOOT, INITIAL ENCOUNTER: Primary | ICD-10-CM

## 2023-11-20 PROCEDURE — 99283 EMERGENCY DEPT VISIT LOW MDM: CPT

## 2023-11-20 PROCEDURE — 99284 EMERGENCY DEPT VISIT MOD MDM: CPT | Performed by: PHYSICIAN ASSISTANT

## 2023-11-20 PROCEDURE — 73660 X-RAY EXAM OF TOE(S): CPT

## 2023-11-20 NOTE — ED PROVIDER NOTES
History  Chief Complaint   Patient presents with    Foot Injury     Reports hit foot on couch this morning. C/O R pinky toe pain.      33y. o female with PMH of epilepsy and myasthenia gravis presents to the ER for right toe pain for 2 hours. Patient states she stubbed her toe on furniture at home. She denies taking any medication for pain. She describes her pain as sharp and non-radiating. Pain is constant. She denies fever, chills, URI symptoms, chest pain, dyspnea, N/V/D, abdominal pain, weakness or paresthesias. History provided by:  Patient   used: Yes ([employer])        Prior to Admission Medications   Prescriptions Last Dose Informant Patient Reported? Taking?   lamoTRIgine (LaMICtal) 100 mg tablet   Yes No   Sig: Take 200 mg by mouth daily    naproxen (NAPROSYN) 500 mg tablet   No No   Sig: Take 1 tablet (500 mg total) by mouth every 12 (twelve) hours as needed for mild pain or moderate pain      Facility-Administered Medications: None       Past Medical History:   Diagnosis Date    Epilepsy ([facility])     Myasthenia gravis ([facility])        Past Surgical History:   Procedure Laterality Date    TOTAL THYMECTOMY         History reviewed. No pertinent family history. I have reviewed and agree with the history as documented. E-Cigarette/Vaping    E-Cigarette Use Never User      E-Cigarette/Vaping Substances     Social History     Tobacco Use    Smoking status: Never    Smokeless tobacco: Never   Vaping Use    Vaping Use: Never used   Substance Use Topics    Alcohol use: No    Drug use: No       Review of Systems   Constitutional:  Negative for activity change, appetite change, chills and fever. HENT:  Negative for congestion, drooling, ear discharge, ear pain, facial swelling, rhinorrhea and sore throat. Eyes:  Negative for redness. Respiratory:  Negative for cough and shortness of breath. Cardiovascular:  Negative for chest pain.    Gastrointestinal:  Negative for abdominal pain, diarrhea, nausea and vomiting. Musculoskeletal:  Negative for neck stiffness. Skin:  Negative for rash. Allergic/Immunologic: Negative for food allergies. Neurological:  Negative for weakness and numbness. Physical Exam  Physical Exam  Vitals and nursing note reviewed. Constitutional:       General: She is not in acute distress. Appearance: She is not toxic-appearing. HENT:      Head: Normocephalic and atraumatic. Eyes:      Conjunctiva/sclera: Conjunctivae normal.   Neck:      Trachea: No tracheal deviation. Cardiovascular:      Rate and Rhythm: Normal rate. Pulmonary:      Effort: Pulmonary effort is normal. No respiratory distress. Abdominal:      General: There is no distension. Musculoskeletal:      Cervical back: Normal range of motion and neck supple. Right foot: Normal range of motion. Swelling, tenderness and bony tenderness present. No deformity, foot drop, laceration or crepitus. Normal pulse. Feet:    Skin:     General: Skin is warm and dry. Findings: No rash. Neurological:      Mental Status: She is alert. GCS: GCS eye subscore is 4. GCS verbal subscore is 5. GCS motor subscore is 6. Psychiatric:         Mood and Affect: Mood normal.         Vital Signs  ED Triage Vitals [11/20/23 1029]   Temperature Pulse Respirations BP SpO2   98.6 °F (37 °C) 71 16 -- 100 %      Temp Source Heart Rate Source Patient Position - Orthostatic VS BP Location FiO2 (%)   Oral Monitor Sitting Right arm --      Pain Score       10 - Worst Possible Pain           Vitals:    11/20/23 1029   Pulse: 71   Patient Position - Orthostatic VS: Sitting         Visual Acuity      ED Medications  Medications - No data to display    Diagnostic Studies  Results Reviewed       None                   XR toe fifth min 2 views RIGHT   ED Interpretation by Augusto Cabrera PA-C (11/20 1217)   Possible fracture of the fifth distal phalanx seen by me at this time. Procedures  Orthopedic injury treatment    Date/Time: 11/20/2023 12:18 PM    Performed by: Froilan Perez PA-C  Authorized by: Froilan Perez PA-C    Patient Location:  ED  [place] Protocol:  Procedure performed by: (ED RN)  Consent: Verbal consent obtained. Consent given by: patient  Patient understanding: patient states understanding of the procedure being performed  Radiology Images displayed and confirmed. If images not available, report reviewed: imaging studies available  Patient identity confirmed: arm band    Injury location:  Toe  Location details:  Right fifth toe  Injury type:  Fracture  Fracture type: distal phalanx    Neurovascular status: Neurovascularly intact    Distal perfusion: normal    Neurological function: normal    Range of motion: normal    Local anesthesia used?: No    General anesthesia used?: No    Manipulation performed?: No    Immobilization:  Other (comment) (orthopedic shoe)  Neurovascular status: Neurovascularly intact    Distal perfusion: normal    Neurological function: normal    Range of motion: normal    Patient tolerance:  Patient tolerated the procedure well with no immediate complications           ED Course                                             Medical Decision Making  33y. o female presents to the ER for right toe pain for 2 hours. Vitals are stable. Patient is in no acute distress. On exam, breathing is non-labored. Heart is regular rate. Abdomen is not distended. Swelling of right little toe seen. No erythema or ecchymosis. Area is tender palpation. Normal ROM of toes. Neurovascularly intact. Will check imaging. 1217 - Possible fracture of the fifth distal phalanx seen by me at this time on xray. This correlates to where patient is having pain. Will place in orthopedic shoe and give podiatry follow up. The management plan was discussed in detail with the patient at bedside and all questions were answered.   Prior to discharge, we provided both verbal and written instructions. We discussed with the patient the signs and symptoms for which to return to the emergency department. All questions were answered and patient was comfortable with the plan of care and discharged to home. Instructed the patient to follow up with the primary care provider and/or specialist provided and their written instructions. The patient verbalized understanding of our discussion and plan of care, and agrees to return to the Emergency Department for concerns and progression of illness. At discharge, I instructed the patient to:  -follow up with pcp  -take Tylenol or Motrin for pain  -rest, ice and elevate the foot  -follow up with Podiatry  -return to the ER if symptoms worsened or new symptoms arose  Patient agreed to this plan and was stable at time of discharge. Problems Addressed:  Closed nondisplaced fracture of distal phalanx of lesser toe of right foot, initial encounter: acute illness or injury    Amount and/or Complexity of Data Reviewed  Independent Historian:      Details: Patient is historian  Radiology: ordered and independent interpretation performed. Disposition  Final diagnoses:   Closed nondisplaced fracture of distal phalanx of lesser toe of right foot, initial encounter     Time reflects when diagnosis was documented in both MDM as applicable and the Disposition within this note       Time User Action Codes Description Comment    11/20/2023 12:18 PM Jazmyne YANCEY Add [O50.650M] Closed nondisplaced fracture of distal phalanx of lesser toe of right foot, initial encounter           ED Disposition       ED Disposition   Discharge    Condition   Stable    Date/Time   Mon Nov 20, 2023 12:18 PM    Comment   Irene High discharge to home/self care.                    Follow-up Information       Follow up With Specialties Details Why Contact Info Additional [id number] [facility] Schedule an appointment as soon as possible for a visit   [address], [address] [facility] [id number]  [address] [facility], [address], [address], [address] [place] [address],[address] Podiatry Schedule an appointment as soon as possible for a visit   [address] [facility] [address] [facility] [address]  [address] [place] [address], [address] [place] [address], [address] [place] [address]            Discharge Medication List as of 11/20/2023 12:20 PM        CONTINUE these medications which have NOT CHANGED    Details   lamoTRIgine (LaMICtal) 100 mg tablet Take 200 mg by mouth daily , Historical Med      naproxen (NAPROSYN) 500 mg tablet Take 1 tablet (500 mg total) by mouth every 12 (twelve) hours as needed for mild pain or moderate pain, Starting Tue 7/28/2020, Print             No discharge procedures on file.     PDMP Review       None            ED Provider  Electronically Signed by             Mesha Segura PA-C  11/20/23 3986

## 2023-11-20 NOTE — DISCHARGE INSTRUCTIONS
DISCHARGE INSTRUCTIONS:    FOLLOW UP WITH YOUR PRIMARY CARE PROVIDER OR THE [address]. MAKE AN APPOINTMENT TO BE SEEN. TAKE TYLENOL OR MOTRIN FOR PAIN. FOLLOW UP WITH THE RECOMMENDED PODIATRY SPECIALIST. MAKE AN APPOINTMENT TO BE SEEN. REST, ICE AND ELEVATE THE AREA. WEAR THE ORTHOPEDIC SHOE. IF SYMPTOMS WORSEN OR NEW SYMPTOMS ARISE, RETURN TO THE ER TO BE SEEN.

## 2024-03-28 ENCOUNTER — HOSPITAL ENCOUNTER (EMERGENCY)
Facility: HOSPITAL | Age: 34
Discharge: HOME | End: 2024-03-28
Attending: EMERGENCY MEDICINE
Payer: COMMERCIAL

## 2024-03-28 VITALS
TEMPERATURE: 98.8 F | RESPIRATION RATE: 16 BRPM | DIASTOLIC BLOOD PRESSURE: 79 MMHG | OXYGEN SATURATION: 100 % | HEART RATE: 81 BPM | SYSTOLIC BLOOD PRESSURE: 106 MMHG

## 2024-03-28 DIAGNOSIS — T50.901A ACCIDENTAL OVERDOSE, INITIAL ENCOUNTER: Primary | ICD-10-CM

## 2024-03-28 DIAGNOSIS — Z87.898 HISTORY OF SEIZURES: ICD-10-CM

## 2024-03-28 LAB
ALBUMIN SERPL BCP-MCNC: 4.4 G/DL (ref 3.5–5)
ALP SERPL-CCNC: 45 U/L (ref 34–104)
ALT SERPL W P-5'-P-CCNC: 9 U/L (ref 7–52)
ANION GAP SERPL CALCULATED.3IONS-SCNC: 8 MMOL/L (ref 4–13)
APAP SERPL-MCNC: <2 UG/ML (ref 10–20)
AST SERPL W P-5'-P-CCNC: 14 U/L (ref 13–39)
ATRIAL RATE: 91 BPM
BASOPHILS # BLD AUTO: 0.01 THOUSANDS/ÂΜL (ref 0–0.1)
BASOPHILS NFR BLD AUTO: 0 % (ref 0–1)
BILIRUB SERPL-MCNC: 0.28 MG/DL (ref 0.2–1)
BUN SERPL-MCNC: 11 MG/DL (ref 5–25)
CALCIUM SERPL-MCNC: 9.3 MG/DL (ref 8.4–10.2)
CARDIAC TROPONIN I PNL SERPL HS: <2 NG/L
CHLORIDE SERPL-SCNC: 107 MMOL/L (ref 96–108)
CK SERPL-CCNC: 63 U/L (ref 26–192)
CO2 SERPL-SCNC: 23 MMOL/L (ref 21–32)
CREAT SERPL-MCNC: 0.75 MG/DL (ref 0.6–1.3)
EOSINOPHIL # BLD AUTO: 0 THOUSAND/ÂΜL (ref 0–0.61)
EOSINOPHIL NFR BLD AUTO: 0 % (ref 0–6)
ERYTHROCYTE [DISTWIDTH] IN BLOOD BY AUTOMATED COUNT: 12.6 % (ref 11.6–15.1)
ETHANOL SERPL-MCNC: <10 MG/DL
EXT PREGNANCY TEST URINE: NEGATIVE
EXT. CONTROL: NORMAL
GFR SERPL CREATININE-BSD FRML MDRD: 105 ML/MIN/1.73SQ M
GLUCOSE SERPL-MCNC: 98 MG/DL (ref 65–140)
HCT VFR BLD AUTO: 39.4 % (ref 34.8–46.1)
HGB BLD-MCNC: 13 G/DL (ref 11.5–15.4)
IMM GRANULOCYTES # BLD AUTO: 0.01 THOUSAND/UL (ref 0–0.2)
IMM GRANULOCYTES NFR BLD AUTO: 0 % (ref 0–2)
LYMPHOCYTES # BLD AUTO: 0.99 THOUSANDS/ÂΜL (ref 0.6–4.47)
LYMPHOCYTES NFR BLD AUTO: 20 % (ref 14–44)
MAGNESIUM SERPL-MCNC: 1.9 MG/DL (ref 1.9–2.7)
MCH RBC QN AUTO: 29 PG (ref 26.8–34.3)
MCHC RBC AUTO-ENTMCNC: 33 G/DL (ref 31.4–37.4)
MCV RBC AUTO: 88 FL (ref 82–98)
MONOCYTES # BLD AUTO: 0.38 THOUSAND/ÂΜL (ref 0.17–1.22)
MONOCYTES NFR BLD AUTO: 8 % (ref 4–12)
NEUTROPHILS # BLD AUTO: 3.57 THOUSANDS/ÂΜL (ref 1.85–7.62)
NEUTS SEG NFR BLD AUTO: 72 % (ref 43–75)
NRBC BLD AUTO-RTO: 0 /100 WBCS
P AXIS: 65 DEGREES
PLATELET # BLD AUTO: 262 THOUSANDS/UL (ref 149–390)
PMV BLD AUTO: 11.1 FL (ref 8.9–12.7)
POTASSIUM SERPL-SCNC: 3.6 MMOL/L (ref 3.5–5.3)
PR INTERVAL: 150 MS
PROT SERPL-MCNC: 7.7 G/DL (ref 6.4–8.4)
QRS AXIS: 68 DEGREES
QRSD INTERVAL: 78 MS
QT INTERVAL: 338 MS
QTC INTERVAL: 415 MS
RBC # BLD AUTO: 4.49 MILLION/UL (ref 3.81–5.12)
SALICYLATES SERPL-MCNC: <5 MG/DL (ref 3–20)
SODIUM SERPL-SCNC: 138 MMOL/L (ref 135–147)
T WAVE AXIS: 55 DEGREES
TSH SERPL DL<=0.05 MIU/L-ACNC: 3.07 UIU/ML (ref 0.45–4.5)
VENTRICULAR RATE: 91 BPM
WBC # BLD AUTO: 4.96 THOUSAND/UL (ref 4.31–10.16)

## 2024-03-28 PROCEDURE — 93010 ELECTROCARDIOGRAM REPORT: CPT | Performed by: INTERNAL MEDICINE

## 2024-03-28 PROCEDURE — 36415 COLL VENOUS BLD VENIPUNCTURE: CPT

## 2024-03-28 PROCEDURE — 82550 ASSAY OF CK (CPK): CPT

## 2024-03-28 PROCEDURE — 96374 THER/PROPH/DIAG INJ IV PUSH: CPT

## 2024-03-28 PROCEDURE — 82077 ASSAY SPEC XCP UR&BREATH IA: CPT

## 2024-03-28 PROCEDURE — 80179 DRUG ASSAY SALICYLATE: CPT

## 2024-03-28 PROCEDURE — 80053 COMPREHEN METABOLIC PANEL: CPT

## 2024-03-28 PROCEDURE — 84443 ASSAY THYROID STIM HORMONE: CPT

## 2024-03-28 PROCEDURE — 96361 HYDRATE IV INFUSION ADD-ON: CPT

## 2024-03-28 PROCEDURE — 93005 ELECTROCARDIOGRAM TRACING: CPT

## 2024-03-28 PROCEDURE — 83735 ASSAY OF MAGNESIUM: CPT

## 2024-03-28 PROCEDURE — 80143 DRUG ASSAY ACETAMINOPHEN: CPT

## 2024-03-28 PROCEDURE — 81025 URINE PREGNANCY TEST: CPT

## 2024-03-28 PROCEDURE — 99284 EMERGENCY DEPT VISIT MOD MDM: CPT

## 2024-03-28 PROCEDURE — 85025 COMPLETE CBC W/AUTO DIFF WBC: CPT

## 2024-03-28 PROCEDURE — 99285 EMERGENCY DEPT VISIT HI MDM: CPT

## 2024-03-28 PROCEDURE — 84484 ASSAY OF TROPONIN QUANT: CPT

## 2024-03-28 RX ORDER — MECLIZINE HYDROCHLORIDE 25 MG/1
25 TABLET ORAL ONCE
Status: COMPLETED | OUTPATIENT
Start: 2024-03-28 | End: 2024-03-28

## 2024-03-28 RX ORDER — KETOROLAC TROMETHAMINE 30 MG/ML
15 INJECTION, SOLUTION INTRAMUSCULAR; INTRAVENOUS ONCE
Status: COMPLETED | OUTPATIENT
Start: 2024-03-28 | End: 2024-03-28

## 2024-03-28 RX ADMIN — KETOROLAC TROMETHAMINE 15 MG: 30 INJECTION, SOLUTION INTRAMUSCULAR; INTRAVENOUS at 05:29

## 2024-03-28 RX ADMIN — MECLIZINE HYDROCHLORIDE 25 MG: 25 TABLET ORAL at 05:05

## 2024-03-28 RX ADMIN — SODIUM CHLORIDE 1000 ML: 0.9 INJECTION, SOLUTION INTRAVENOUS at 05:30

## 2024-03-28 NOTE — ED NOTES
Pt reports she is unable to provide urine sample at this time. Per ALAN Carroll ok to give toradol.      Blessing Davis RN  03/28/24 5039

## 2024-03-28 NOTE — ED CARE HANDOFF
Emergency Department Sign Out Note        Sign out and transfer of care from Maru Carroll PA-C. See Separate Emergency Department note.     The patient, Irene Fernández, was evaluated by the previous provider for accidental ingestion of too much Lamictal..    Workup Completed:  Results Reviewed       Procedure Component Value Units Date/Time    Comprehensive metabolic panel [[id number]] Collected: 03/28/24 0525    Lab Status: Final result Specimen: Blood from Arm, Left Updated: 03/28/24 0709     Sodium 138 mmol/L      Potassium 3.6 mmol/L      Chloride 107 mmol/L      CO2 23 mmol/L      ANION GAP 8 mmol/L      BUN 11 mg/dL      Creatinine 0.75 mg/dL      Glucose 98 mg/dL      Calcium 9.3 mg/dL      AST 14 U/L      ALT 9 U/L      Alkaline Phosphatase 45 U/L      Total Protein 7.7 g/dL      Albumin 4.4 g/dL      Total Bilirubin 0.28 mg/dL      eGFR 105 ml/min/1.73sq m     Narrative:      National Kidney Disease Foundation guidelines for Chronic Kidney Disease (CKD):     Stage 1 with normal or high GFR (GFR > 90 mL/min/1.73 square meters)    Stage 2 Mild CKD (GFR = 60-89 mL/min/1.73 square meters)    Stage 3A Moderate CKD (GFR = 45-59 mL/min/1.73 square meters)    Stage 3B Moderate CKD (GFR = 30-44 mL/min/1.73 square meters)    Stage 4 Severe CKD (GFR = 15-29 mL/min/1.73 square meters)    Stage 5 End Stage CKD (GFR <15 mL/min/1.73 square meters)  Note: GFR calculation is accurate only with a steady state creatinine    Magnesium [[id number]]  (Normal) Collected: 03/28/24 0525    Lab Status: Final result Specimen: Blood from Arm, Left Updated: 03/28/24 0709     Magnesium 1.9 mg/dL     Salicylate level [[id number]]  (Normal) Collected: 03/28/24 0525    Lab Status: Final result Specimen: Blood from Arm, Left Updated: 03/28/24 0709     Salicylate Lvl <5 mg/dL     Acetaminophen level-If concentration is detectable, please discuss with medical  on call. [[id number]]  (Abnormal) Collected: 03/28/24 0525     Lab Status: Final result Specimen: Blood from Arm, Left Updated: 03/28/24 0709     Acetaminophen Level <2 ug/mL     CK [[id number]]  (Normal) Collected: 03/28/24 0525    Lab Status: Final result Specimen: Blood from Arm, Left Updated: 03/28/24 0709     Total CK 63 U/L     POCT pregnancy, urine [[id number]]  (Normal) Resulted: 03/28/24 0618    Lab Status: Final result Updated: 03/28/24 0618     EXT Preg Test, Ur Negative     Control Valid    TSH, 3rd generation with Free T4 reflex [[id number]]  (Normal) Collected: 03/28/24 0525    Lab Status: Final result Specimen: Blood from Arm, Left Updated: 03/28/24 0607     TSH 3RD GENERATON 3.070 uIU/mL     HS Troponin 0hr (reflex protocol) [[id number]]  (Normal) Collected: 03/28/24 0525    Lab Status: Final result Specimen: Blood from Arm, Left Updated: 03/28/24 0558     hs TnI 0hr <2 ng/L     Ethanol [[id number]]  (Normal) Collected: 03/28/24 0525    Lab Status: Final result Specimen: Blood from Arm, Left Updated: 03/28/24 0551     Ethanol Lvl <10 mg/dL     CBC and differential [[id number]] Collected: 03/28/24 0525    Lab Status: Final result Specimen: Blood from Arm, Left Updated: 03/28/24 0535     WBC 4.96 Thousand/uL      RBC 4.49 Million/uL      Hemoglobin 13.0 g/dL      Hematocrit 39.4 %      MCV 88 fL      MCH 29.0 pg      MCHC 33.0 g/dL      RDW 12.6 %      MPV 11.1 fL      Platelets 262 Thousands/uL      nRBC 0 /100 WBCs      Neutrophils Relative 72 %      Immature Grans % 0 %      Lymphocytes Relative 20 %      Monocytes Relative 8 %      Eosinophils Relative 0 %      Basophils Relative 0 %      Neutrophils Absolute 3.57 Thousands/µL      Absolute Immature Grans 0.01 Thousand/uL      Absolute Lymphocytes 0.99 Thousands/µL      Absolute Monocytes 0.38 Thousand/µL      Eosinophils Absolute 0.00 Thousand/µL      Basophils Absolute 0.01 Thousands/µL           No orders to display         ED Course / Workup Pending (followup):  Patient is a 33-year-old female history of  epilepsy and myasthenia gravis who presented to the ER for accidentally taking 2 doses instead of 1 dose of her Lamictal last night.  This was accidental patient had no intention to harm herself.  States that later when she woke up with dizziness, headache, and full body heaviness.  She was evaluated by previous provider who did speak with poison control.  Basic labs were ordered.  Poison control recommended that patient is watched until her symptoms resolved and she is able to ambulate without any difficulty.  They do not recommend any particular timeframe or particular lab test.  Pending lab results and reevaluation during time of signout.      Patient reevaluated.  She states that she is no longer dizzy and feels significantly better.  She was able to tolerate p.o. intake and ambulate around the ER without any difficulty.  She is denying any symptoms at this time.  She has remained stable under my care in the ER and is stable for discharge.  I discussed with patient that is of utmost importance that she talks to her neurologist as soon as possible to discuss further dosing of this medication.  She is understanding agreeable with plan.  Advised strict return precautions to the ER.                      ED Course as of 03/28/24 1222   Thu Mar 28, 2024   0557 S/o from [facility] PHILL[facility]. Pt w/ hx of epilepsy and MG. Took Lamictal at 9pm. Normally takes 300/400. Accidentally took 400mg last night twice. Woke at 3am w/ dizziness and headache. Poison control recommends watching for a couple of hours.    1202 Patient reevaluated.  She states that she is no longer dizzy and feels significantly better.  She was able to tolerate p.o. intake and ambulate around the ER without any difficulty.  She is denying any symptoms at this time.  She has remained stable under my care in the ER and is stable for discharge.     Procedures  Medical Decision Making  Amount and/or Complexity of Data Reviewed  Labs: ordered.    Risk  Prescription drug  management.            Disposition  Final diagnoses:   Accidental overdose, initial encounter   History of seizures     Time reflects when diagnosis was documented in both MDM as applicable and the Disposition within this note       Time User Action Codes Description Comment    3/28/2024  6:08 AM Maru Carroll Add [T50.901A] Accidental overdose, initial encounter     3/28/2024  6:27 AM Maru Carroll Add [Z87.898] History of seizures           ED Disposition       ED Disposition   Discharge    Condition   Stable    Date/Time   Th Mar 28, 2024 12:02 PM    Comment   Irene Fernández discharge to home/self care.                   Follow-up Information       Follow up With Specialties Details Why Contact Info Additional Information    Jeanine Diaz MD Neurology Schedule an appointment as soon as possible for a visit in 1 day  [address] [facility]  [address]  [facility] [address]  [phone]       [facility] Emergency Department Emergency Medicine  If symptoms worsen [address] [place] [id number] [phone] [facility] Emergency Department, [address] [place] [address], [address]          Patient's Medications   Discharge Prescriptions    No medications on file            ED Provider  Electronically Signed by     Aviva Dominguez PA-C  03/28/24 3466

## 2024-03-28 NOTE — DISCHARGE INSTRUCTIONS
Please follow closely with your neurologist and discuss further dosing with your neurologist.  Please immediately return to the ER in case of new or worsening symptoms.

## 2024-03-28 NOTE — ED PROVIDER NOTES
"History  Chief Complaint   Patient presents with    Medical Problem     Pt brought in via EMS from home. Per EMS pt accidentally took 2 doses of her lamictal around 2100. Pt c/o dizziness and her arms feel \"weird\". Denies n/v/d.      The patient is a 33 YOF with history of epilepsy, myasthenia gravis who presents to the ED for evaluation after she accidentally took two doses of Lamictal at 9 PM last night. She reports she took 800 mg in total, as she usually takes 400 mg at night. This was accidental, she denies any intention of hurting herself by doing this. She states that she woke up around 3 AM with dizziness, headache, and feeling full body \"heaviness.\" She denies that she could have taken any other medications at that time. She denies nausea, vomiting, abdominal pain, focal weakness, dyspnea, chest pain. Denies SI/HI.        Prior to Admission Medications   Prescriptions Last Dose Informant Patient Reported? Taking?   lamoTRIgine (LaMICtal) 100 mg tablet   Yes No   Sig: Take 200 mg by mouth daily    naproxen (NAPROSYN) 500 mg tablet   No No   Sig: Take 1 tablet (500 mg total) by mouth every 12 (twelve) hours as needed for mild pain or moderate pain      Facility-Administered Medications: None       Past Medical History:   Diagnosis Date    Epilepsy (HCC)     Myasthenia gravis (HCC)        Past Surgical History:   Procedure Laterality Date    TOTAL THYMECTOMY         History reviewed. No pertinent family history.  I have reviewed and agree with the history as documented.    E-Cigarette/Vaping    E-Cigarette Use Never User      E-Cigarette/Vaping Substances     Social History     Tobacco Use    Smoking status: Never    Smokeless tobacco: Never   Vaping Use    Vaping status: Never Used   Substance Use Topics    Alcohol use: No    Drug use: No       Review of Systems   Constitutional:  Negative for chills and fever.   HENT:  Negative for congestion and rhinorrhea.    Respiratory:  Negative for cough and shortness " of breath.    Cardiovascular:  Negative for chest pain and leg swelling.   Gastrointestinal:  Negative for abdominal pain, constipation, diarrhea, nausea and vomiting.   Genitourinary:  Negative for dysuria and flank pain.   Musculoskeletal:  Positive for myalgias. Negative for arthralgias.   Skin:  Negative for rash and wound.   Neurological:  Positive for dizziness, weakness and headaches. Negative for numbness.   Psychiatric/Behavioral:  Negative for behavioral problems.        Physical Exam  Physical Exam  Vitals and nursing note reviewed.   Constitutional:       General: She is not in acute distress.     Appearance: She is well-developed.   HENT:      Head: Normocephalic and atraumatic.   Eyes:      Conjunctiva/sclera: Conjunctivae normal.   Cardiovascular:      Rate and Rhythm: Normal rate and regular rhythm.      Heart sounds: Murmur heard.   Pulmonary:      Effort: Pulmonary effort is normal. No respiratory distress.      Breath sounds: Normal breath sounds.   Abdominal:      Palpations: Abdomen is soft.      Tenderness: There is no abdominal tenderness.   Musculoskeletal:         General: No swelling.      Cervical back: Neck supple.   Skin:     General: Skin is warm and dry.      Capillary Refill: Capillary refill takes less than 2 seconds.   Neurological:      Mental Status: She is alert and oriented to person, place, and time.      GCS: GCS eye subscore is 4. GCS verbal subscore is 5. GCS motor subscore is 6.      Cranial Nerves: Cranial nerves 2-12 are intact. No facial asymmetry.      Sensory: Sensation is intact.      Motor: Motor function is intact. No pronator drift.      Coordination: Coordination is intact. Finger-Nose-Finger Test normal.   Psychiatric:         Mood and Affect: Mood normal. Affect is tearful.         Vital Signs  ED Triage Vitals   Temperature Pulse Respirations Blood Pressure SpO2   03/28/24 0418 03/28/24 0418 03/28/24 0418 03/28/24 0418 03/28/24 0418   98.8 °F (37.1 °C) 100 18  138/71 100 %      Temp Source Heart Rate Source Patient Position - Orthostatic VS BP Location FiO2 (%)   03/28/24 0418 03/28/24 0418 03/28/24 0418 03/28/24 0418 --   Oral Monitor Lying Right arm       Pain Score       03/28/24 0529       9           Vitals:    03/28/24 0418   BP: 138/71   Pulse: 100   Patient Position - Orthostatic VS: Lying         Visual Acuity  Visual Acuity      Flowsheet Row Most Recent Value   L Pupil Size (mm) 3   R Pupil Size (mm) 3            ED Medications  Medications   sodium chloride 0.9 % bolus 1,000 mL (1,000 mL Intravenous New Bag 3/28/24 0530)   meclizine (ANTIVERT) tablet 25 mg (25 mg Oral Given 3/28/24 0505)   ketorolac (TORADOL) injection 15 mg (15 mg Intravenous Given 3/28/24 0529)       Diagnostic Studies  Results Reviewed       Procedure Component Value Units Date/Time    TSH, 3rd generation with Free T4 reflex [[id number]]  (Normal) Collected: 03/28/24 0525    Lab Status: Final result Specimen: Blood from Arm, Left Updated: 03/28/24 0607     TSH 3RD GENERATON 3.070 uIU/mL     HS Troponin 0hr (reflex protocol) [[id number]]  (Normal) Collected: 03/28/24 0525    Lab Status: Final result Specimen: Blood from Arm, Left Updated: 03/28/24 0558     hs TnI 0hr <2 ng/L     Ethanol [[id number]]  (Normal) Collected: 03/28/24 0525    Lab Status: Final result Specimen: Blood from Arm, Left Updated: 03/28/24 0551     Ethanol Lvl <10 mg/dL     CBC and differential [[id number]] Collected: 03/28/24 0525    Lab Status: Final result Specimen: Blood from Arm, Left Updated: 03/28/24 0535     WBC 4.96 Thousand/uL      RBC 4.49 Million/uL      Hemoglobin 13.0 g/dL      Hematocrit 39.4 %      MCV 88 fL      MCH 29.0 pg      MCHC 33.0 g/dL      RDW 12.6 %      MPV 11.1 fL      Platelets 262 Thousands/uL      nRBC 0 /100 WBCs      Neutrophils Relative 72 %      Immature Grans % 0 %      Lymphocytes Relative 20 %      Monocytes Relative 8 %      Eosinophils Relative 0 %      Basophils Relative 0 %       Neutrophils Absolute 3.57 Thousands/µL      Absolute Immature Grans 0.01 Thousand/uL      Absolute Lymphocytes 0.99 Thousands/µL      Absolute Monocytes 0.38 Thousand/µL      Eosinophils Absolute 0.00 Thousand/µL      Basophils Absolute 0.01 Thousands/µL     Comprehensive metabolic panel [[id number]] Collected: 03/28/24 0525    Lab Status: In process Specimen: Blood from Arm, Left Updated: 03/28/24 0532    Magnesium [[id number]] Collected: 03/28/24 0525    Lab Status: In process Specimen: Blood from Arm, Left Updated: 03/28/24 0532    Salicylate level [[id number]] Collected: 03/28/24 0525    Lab Status: In process Specimen: Blood from Arm, Left Updated: 03/28/24 0532    Acetaminophen level-If concentration is detectable, please discuss with medical  on call. [[id number]] Collected: 03/28/24 0525    Lab Status: In process Specimen: Blood from Arm, Left Updated: 03/28/24 0532    CK [[id number]]     Lab Status: No result Specimen: Blood     POCT pregnancy, urine [[id number]]     Lab Status: No result                    No orders to display              Procedures  Procedures         ED Course  ED Course as of 03/28/24 0608   u Mar 28, 2024   0505 EKG: NSR at 91 BPM, , QRS 78, QTc 415, T wave inversions in V2-4, no ST elevation or depression as interpreted by me    0505 Per chart review pt with abnormal EKG listed in 2021 at [facility], anterior T wave abnormalities noted at that time   0550 Case discussed with Sylvia from [employer]. Regarding Lamictal overdoses, seizures have been noted with ingestions >6g.  Mild to moderate overdoses typically cause lethargy, slurred speech, confusion, ataxia, and  tremor. Symptomatic and supportive care recommended. Half life 25-32 hours. Recommend patient stay in ED until she feels well enough to be dc, can ambulate.         SBIRT 22yo+      Flowsheet Row Most Recent Value   Initial Alcohol Screen: US AUDIT-C     1. How often do you have a drink containing alcohol? 0  Filed at: 03/28/2024 0416   2. How many drinks containing alcohol do you have on a typical day you are drinking?  0 Filed at: 03/28/2024 0416   3b. FEMALE Any Age, or MALE 65+: How often do you have 4 or more drinks on one occassion? 0 Filed at: 03/28/2024 0416   Audit-C Score 0 Filed at: 03/28/2024 0416   [facility]: How many times in the past year have you...    Used an illegal drug or used a prescription medication for non-medical reasons? Never Filed at: 03/28/2024 0416              Medical Decision Making  DDx including but not limited to: intentional overdose, accidental overdose, metabolic abnormality, cardiac etiology    Will obtain EKG, troponin to evaluate for ACS.  Will obtain CBC to evaluate for leukocytosis, anemia.  Will obtain CMP to evaluate kidney function, for electrolyte disturbance.  Will obtain TSH, MG.  Will obtain coma panel.  Will obtain urine pregnancy. Case discussed with [facility].    Care turned over to Aviva Dominguez PA-C pending labs, re-evaluation/ambulation     Amount and/or Complexity of Data Reviewed  Labs: ordered.    Risk  Prescription drug management.             Disposition  Final diagnoses:   Accidental overdose, initial encounter     Time reflects when diagnosis was documented in both MDM as applicable and the Disposition within this note       Time User Action Codes Description Comment    3/28/2024  6:08 AM Maru Carroll Add [T50.901A] Accidental overdose, initial encounter           ED Disposition       None          Follow-up Information    None         Patient's Medications   Discharge Prescriptions    No medications on file       No discharge procedures on file.    PDMP Review       None            ED Provider  Electronically Signed by             Maru Carroll PA-C  03/28/24 0608

## 2024-03-28 NOTE — Clinical Note
Irene Fernández was seen and treated in our emergency department on 3/28/2024.                Diagnosis:     Irene  may return to work on return date.    She may return on this date: 03/29/2024         If you have any questions or concerns, please don't hesitate to call.      Aviva Dominguez PA-C    ______________________________           _______________          _______________  Hospital Representative                              Date                                Time

## 2024-03-28 NOTE — ED NOTES
Pt ambulatory without difficulty, feels like she is good to go home.     Lali Davies, TOI  03/28/24 2936

## 2024-06-07 ENCOUNTER — HOSPITAL ENCOUNTER (EMERGENCY)
Facility: HOSPITAL | Age: 34
Discharge: HOME | End: 2024-06-08
Attending: EMERGENCY MEDICINE | Admitting: EMERGENCY MEDICINE

## 2024-06-07 DIAGNOSIS — T50.901A ACCIDENTAL DRUG OVERDOSE, INITIAL ENCOUNTER: Primary | ICD-10-CM

## 2024-06-07 PROCEDURE — 99284 EMERGENCY DEPT VISIT MOD MDM: CPT

## 2024-06-08 VITALS
DIASTOLIC BLOOD PRESSURE: 77 MMHG | OXYGEN SATURATION: 100 % | BODY MASS INDEX: 28.08 KG/M2 | SYSTOLIC BLOOD PRESSURE: 111 MMHG | WEIGHT: 163.58 LBS | HEART RATE: 62 BPM | RESPIRATION RATE: 20 BRPM | TEMPERATURE: 97.9 F

## 2024-06-08 LAB
ALBUMIN SERPL BCP-MCNC: 4.5 G/DL (ref 3.5–5)
ALP SERPL-CCNC: 42 U/L (ref 34–104)
ALT SERPL W P-5'-P-CCNC: 8 U/L (ref 7–52)
ANION GAP SERPL CALCULATED.3IONS-SCNC: 10 MMOL/L (ref 4–13)
APAP SERPL-MCNC: <2 UG/ML (ref 10–20)
AST SERPL W P-5'-P-CCNC: 14 U/L (ref 13–39)
BASOPHILS # BLD AUTO: 0.03 THOUSANDS/ÂΜL (ref 0–0.1)
BASOPHILS NFR BLD AUTO: 0 % (ref 0–1)
BILIRUB SERPL-MCNC: 0.36 MG/DL (ref 0.2–1)
BUN SERPL-MCNC: 15 MG/DL (ref 5–25)
CALCIUM SERPL-MCNC: 9.9 MG/DL (ref 8.4–10.2)
CARDIAC TROPONIN I PNL SERPL HS: <2 NG/L
CHLORIDE SERPL-SCNC: 106 MMOL/L (ref 96–108)
CK SERPL-CCNC: 100 U/L (ref 26–192)
CO2 SERPL-SCNC: 22 MMOL/L (ref 21–32)
CREAT SERPL-MCNC: 0.8 MG/DL (ref 0.6–1.3)
EOSINOPHIL # BLD AUTO: 0.12 THOUSAND/ÂΜL (ref 0–0.61)
EOSINOPHIL NFR BLD AUTO: 2 % (ref 0–6)
ERYTHROCYTE [DISTWIDTH] IN BLOOD BY AUTOMATED COUNT: 12.7 % (ref 11.6–15.1)
ETHANOL SERPL-MCNC: <10 MG/DL
GFR SERPL CREATININE-BSD FRML MDRD: 97 ML/MIN/1.73SQ M
GLUCOSE SERPL-MCNC: 88 MG/DL (ref 65–140)
HCT VFR BLD AUTO: 40.8 % (ref 34.8–46.1)
HGB BLD-MCNC: 13 G/DL (ref 11.5–15.4)
IMM GRANULOCYTES # BLD AUTO: 0.02 THOUSAND/UL (ref 0–0.2)
IMM GRANULOCYTES NFR BLD AUTO: 0 % (ref 0–2)
LYMPHOCYTES # BLD AUTO: 1.7 THOUSANDS/ÂΜL (ref 0.6–4.47)
LYMPHOCYTES NFR BLD AUTO: 23 % (ref 14–44)
MAGNESIUM SERPL-MCNC: 1.9 MG/DL (ref 1.9–2.7)
MCH RBC QN AUTO: 29.1 PG (ref 26.8–34.3)
MCHC RBC AUTO-ENTMCNC: 31.9 G/DL (ref 31.4–37.4)
MCV RBC AUTO: 92 FL (ref 82–98)
MONOCYTES # BLD AUTO: 0.44 THOUSAND/ÂΜL (ref 0.17–1.22)
MONOCYTES NFR BLD AUTO: 6 % (ref 4–12)
NEUTROPHILS # BLD AUTO: 5.2 THOUSANDS/ÂΜL (ref 1.85–7.62)
NEUTS SEG NFR BLD AUTO: 69 % (ref 43–75)
NRBC BLD AUTO-RTO: 0 /100 WBCS
PLATELET # BLD AUTO: 279 THOUSANDS/UL (ref 149–390)
PMV BLD AUTO: 11.7 FL (ref 8.9–12.7)
POTASSIUM SERPL-SCNC: 3.9 MMOL/L (ref 3.5–5.3)
PROT SERPL-MCNC: 7.6 G/DL (ref 6.4–8.4)
RBC # BLD AUTO: 4.46 MILLION/UL (ref 3.81–5.12)
SALICYLATES SERPL-MCNC: <5 MG/DL (ref 3–20)
SODIUM SERPL-SCNC: 138 MMOL/L (ref 135–147)
TSH SERPL DL<=0.05 MIU/L-ACNC: 2.11 UIU/ML (ref 0.45–4.5)
WBC # BLD AUTO: 7.51 THOUSAND/UL (ref 4.31–10.16)

## 2024-06-08 PROCEDURE — 84443 ASSAY THYROID STIM HORMONE: CPT

## 2024-06-08 PROCEDURE — 83735 ASSAY OF MAGNESIUM: CPT

## 2024-06-08 PROCEDURE — 82550 ASSAY OF CK (CPK): CPT

## 2024-06-08 PROCEDURE — 96361 HYDRATE IV INFUSION ADD-ON: CPT

## 2024-06-08 PROCEDURE — 96374 THER/PROPH/DIAG INJ IV PUSH: CPT

## 2024-06-08 PROCEDURE — 84484 ASSAY OF TROPONIN QUANT: CPT

## 2024-06-08 PROCEDURE — 80179 DRUG ASSAY SALICYLATE: CPT

## 2024-06-08 PROCEDURE — 80053 COMPREHEN METABOLIC PANEL: CPT

## 2024-06-08 PROCEDURE — 80143 DRUG ASSAY ACETAMINOPHEN: CPT

## 2024-06-08 PROCEDURE — 36415 COLL VENOUS BLD VENIPUNCTURE: CPT

## 2024-06-08 PROCEDURE — 85025 COMPLETE CBC W/AUTO DIFF WBC: CPT

## 2024-06-08 PROCEDURE — 99285 EMERGENCY DEPT VISIT HI MDM: CPT

## 2024-06-08 PROCEDURE — 82077 ASSAY SPEC XCP UR&BREATH IA: CPT

## 2024-06-08 RX ORDER — MECLIZINE HYDROCHLORIDE 25 MG/1
25 TABLET ORAL ONCE
Status: COMPLETED | OUTPATIENT
Start: 2024-06-08 | End: 2024-06-08

## 2024-06-08 RX ORDER — KETOROLAC TROMETHAMINE 30 MG/ML
15 INJECTION, SOLUTION INTRAMUSCULAR; INTRAVENOUS ONCE
Status: COMPLETED | OUTPATIENT
Start: 2024-06-08 | End: 2024-06-08

## 2024-06-08 RX ADMIN — KETOROLAC TROMETHAMINE 15 MG: 30 INJECTION, SOLUTION INTRAMUSCULAR; INTRAVENOUS at 01:34

## 2024-06-08 RX ADMIN — SODIUM CHLORIDE 1000 ML: 0.9 INJECTION, SOLUTION INTRAVENOUS at 01:29

## 2024-06-08 RX ADMIN — MECLIZINE HYDROCHLORIDE 25 MG: 25 TABLET ORAL at 01:54

## 2024-06-08 NOTE — ED PROVIDER NOTES
History  Chief Complaint   Patient presents with    Overdose - Accidental     Patient states taking one extra dose of epilepsy medications      Irene is a 34-year-old female presenting to the emergency department after taking extra dose of her epilepsy medications.  She reports that she is supposed to take 200 mg of Lamictal daily but accidentally took 400 mg today.  She has since felt dizzy and fatigued, states that it is difficult for her to walk.  This happened once before when she took 800 mg of Lamictal by accident.  She states that her symptoms were similar that time.  Denies vision changes, abdominal pain, vomiting.  She reports that she did not take this medication with the intent to hurt herself but it was an accident.  Denies recent seizures.  Denies other toxic ingestions, drug or alcohol use.      Overdose - Accidental  Associated symptoms: no abdominal pain, no chest pain, no cough, no shortness of breath and no vomiting        Prior to Admission Medications   Prescriptions Last Dose Informant Patient Reported? Taking?   lamoTRIgine (LaMICtal) 100 mg tablet   Yes No   Sig: Take 200 mg by mouth daily    naproxen (NAPROSYN) 500 mg tablet   No No   Sig: Take 1 tablet (500 mg total) by mouth every 12 (twelve) hours as needed for mild pain or moderate pain      Facility-Administered Medications: None       Past Medical History:   Diagnosis Date    Epilepsy (HCC)     Myasthenia gravis (HCC)        Past Surgical History:   Procedure Laterality Date    TOTAL THYMECTOMY         No family history on file.  I have reviewed and agree with the history as documented.    E-Cigarette/Vaping    E-Cigarette Use Never User      E-Cigarette/Vaping Substances     Social History     Tobacco Use    Smoking status: Never    Smokeless tobacco: Never   Vaping Use    Vaping status: Never Used   Substance Use Topics    Alcohol use: No    Drug use: No       Review of Systems   Constitutional:  Positive for fatigue. Negative for  chills and fever.   HENT:  Negative for ear pain and sore throat.    Eyes:  Negative for pain and visual disturbance.   Respiratory:  Negative for cough and shortness of breath.    Cardiovascular:  Negative for chest pain and palpitations.   Gastrointestinal:  Negative for abdominal pain and vomiting.   Genitourinary:  Negative for dysuria and hematuria.   Musculoskeletal:  Positive for gait problem. Negative for arthralgias and back pain.   Skin:  Negative for color change and rash.   Neurological:  Positive for dizziness. Negative for seizures and syncope.   All other systems reviewed and are negative.      Physical Exam  Physical Exam  Vitals and nursing note reviewed.   Constitutional:       General: She is not in acute distress.     Appearance: She is well-developed.      Comments: No tremors, slurred speech, lethargy noted   HENT:      Head: Normocephalic and atraumatic.      Mouth/Throat:      Mouth: Mucous membranes are moist.   Eyes:      Extraocular Movements: Extraocular movements intact.      Conjunctiva/sclera: Conjunctivae normal.      Pupils: Pupils are equal, round, and reactive to light.   Cardiovascular:      Rate and Rhythm: Normal rate and regular rhythm.      Heart sounds: No murmur heard.  Pulmonary:      Effort: Pulmonary effort is normal. No respiratory distress.      Breath sounds: Normal breath sounds. No wheezing, rhonchi or rales.   Abdominal:      Palpations: Abdomen is soft.      Tenderness: There is no abdominal tenderness.   Musculoskeletal:         General: No swelling.      Cervical back: Neck supple. No rigidity or tenderness.      Right lower leg: No edema.      Left lower leg: No edema.   Skin:     General: Skin is warm and dry.      Capillary Refill: Capillary refill takes less than 2 seconds.      Findings: No rash.      Comments: No rash or sores on physical exam   Neurological:      General: No focal deficit present.      Mental Status: She is alert and oriented to person,  place, and time.      Motor: No weakness.      Gait: Gait abnormal (Reports that she is unable to walk, unwilling to attempt).   Psychiatric:         Mood and Affect: Mood normal.         Vital Signs  ED Triage Vitals   Temperature Pulse Respirations Blood Pressure SpO2   06/08/24 0003 06/08/24 0003 06/08/24 0003 06/08/24 0003 06/08/24 0003   97.9 °F (36.6 °C) 67 20 131/87 99 %      Temp Source Heart Rate Source Patient Position - Orthostatic VS BP Location FiO2 (%)   06/08/24 0003 06/08/24 0003 06/08/24 0003 06/08/24 0003 --   Oral Monitor Lying Right arm       Pain Score       06/08/24 0100       2           Vitals:    06/08/24 0003 06/08/24 0300   BP: 131/87 111/77   Pulse: 67 62   Patient Position - Orthostatic VS: Lying Lying         Visual Acuity  Visual Acuity      Flowsheet Row Most Recent Value   L Pupil Size (mm) 3   R Pupil Size (mm) 3            ED Medications  Medications   sodium chloride 0.9 % bolus 1,000 mL (0 mL Intravenous Stopped 6/8/24 0319)   ketorolac (TORADOL) injection 15 mg (15 mg Intravenous Given 6/8/24 0134)   meclizine (ANTIVERT) tablet 25 mg (25 mg Oral Given 6/8/24 0154)       Diagnostic Studies  Results Reviewed       Procedure Component Value Units Date/Time    TSH, 3rd generation with Free T4 reflex [[id number]]  (Normal) Collected: 06/08/24 0224    Lab Status: Final result Specimen: Blood from Arm, Left Updated: 06/08/24 0304     TSH 3RD GENERATON 2.112 uIU/mL     HS Troponin 0hr (reflex protocol) [[id number]]  (Normal) Collected: 06/08/24 0224    Lab Status: Final result Specimen: Blood from Arm, Left Updated: 06/08/24 0257     hs TnI 0hr <2 ng/L     Comprehensive metabolic panel [[id number]] Collected: 06/08/24 0224    Lab Status: Final result Specimen: Blood from Arm, Left Updated: 06/08/24 0254     Sodium 138 mmol/L      Potassium 3.9 mmol/L      Chloride 106 mmol/L      CO2 22 mmol/L      ANION GAP 10 mmol/L      BUN 15 mg/dL      Creatinine 0.80 mg/dL      Glucose 88 mg/dL       Calcium 9.9 mg/dL      AST 14 U/L      ALT 8 U/L      Alkaline Phosphatase 42 U/L      Total Protein 7.6 g/dL      Albumin 4.5 g/dL      Total Bilirubin 0.36 mg/dL      eGFR 97 ml/min/1.73sq m     Narrative:      [employer] guidelines for Chronic Kidney Disease (CKD):     Stage 1 with normal or high GFR (GFR > 90 mL/min/1.73 square meters)    Stage 2 Mild CKD (GFR = 60-89 mL/min/1.73 square meters)    Stage 3A Moderate CKD (GFR = 45-59 mL/min/1.73 square meters)    Stage 3B Moderate CKD (GFR = 30-44 mL/min/1.73 square meters)    Stage 4 Severe CKD (GFR = 15-29 mL/min/1.73 square meters)    Stage 5 End Stage CKD (GFR <15 mL/min/1.73 square meters)  Note: GFR calculation is accurate only with a steady state creatinine    CK [[id number]]  (Normal) Collected: 06/08/24 0224    Lab Status: Final result Specimen: Blood from Arm, Left Updated: 06/08/24 0254     Total  U/L     Acetaminophen level-If concentration is detectable, please discuss with medical  on call. [[id number]]  (Abnormal) Collected: 06/08/24 0224    Lab Status: Final result Specimen: Blood from Arm, Left Updated: 06/08/24 0254     Acetaminophen Level <2 ug/mL     Magnesium [[id number]]  (Normal) Collected: 06/08/24 0224    Lab Status: Final result Specimen: Blood from Arm, Left Updated: 06/08/24 0254     Magnesium 1.9 mg/dL     Salicylate level [[id number]]  (Normal) Collected: 06/08/24 0224    Lab Status: Final result Specimen: Blood from Arm, Left Updated: 06/08/24 0254     Salicylate Lvl <5 mg/dL     Ethanol [[id number]]  (Normal) Collected: 06/08/24 0224    Lab Status: Final result Specimen: Blood from Arm, Left Updated: 06/08/24 0251     Ethanol Lvl <10 mg/dL     CBC and differential [[id number]] Collected: 06/08/24 0224    Lab Status: Final result Specimen: Blood from Arm, Left Updated: 06/08/24 0231     WBC 7.51 Thousand/uL      RBC 4.46 Million/uL      Hemoglobin 13.0 g/dL      Hematocrit 40.8 %      MCV 92 fL       MCH 29.1 pg      MCHC 31.9 g/dL      RDW 12.7 %      MPV 11.7 fL      Platelets 279 Thousands/uL      nRBC 0 /100 WBCs      Segmented % 69 %      Immature Grans % 0 %      Lymphocytes % 23 %      Monocytes % 6 %      Eosinophils Relative 2 %      Basophils Relative 0 %      Absolute Neutrophils 5.20 Thousands/µL      Absolute Immature Grans 0.02 Thousand/uL      Absolute Lymphocytes 1.70 Thousands/µL      Absolute Monocytes 0.44 Thousand/µL      Eosinophils Absolute 0.12 Thousand/µL      Basophils Absolute 0.03 Thousands/µL                    No orders to display              Procedures  Procedures         ED Course                               SBIRT 20yo+      Flowsheet Row Most Recent Value   Initial Alcohol Screen: US AUDIT-C     1. How often do you have a drink containing alcohol? 0 Filed at: 06/08/2024 0321   2. How many drinks containing alcohol do you have on a typical day you are drinking?  0 Filed at: 06/08/2024 0321   3a. Male UNDER 65: How often do you have five or more drinks on one occasion? 0 Filed at: 06/08/2024 0321   3b. FEMALE Any Age, or MALE 65+: How often do you have 4 or more drinks on one occassion? 0 Filed at: 06/08/2024 0321   Audit-C Score 0 Filed at: 06/08/2024 0321   [facility]: How many times in the past year have you...    Used an illegal drug or used a prescription medication for non-medical reasons? Never Filed at: 06/08/2024 0321                      Medical Decision Making  Patient presents similarly to previous ingestion of greater than recommended dose of Lamictal. Coma panel, TSH, troponin, CBC, CMP, CK performed and WNL.  No arrhythmias noted on ECG.  No rashes, tremors, lethargy noted to suggest toxic level of ingestion.  Patient monitored in the ED for nearly 4 hours, received IV fluids, Toradol, meclizine.  She reported complete resolution of her symptoms.  Able to walk with stable gait.  Recommended following up with primary care.    Discussed findings from the visit with the  "patient.  We had a conversation regarding supportive care and indications for return.  Recommended appropriate follow-up.  Patient and/or family understand and agree with plan.    Portions of the record may have been created with voice recognition software. Occasional use of the incorrect word or \"sound a like\" substitutions may have occurred due to the inherent limitations of voice recognition software. Read the chart carefully and recognize, using context, where substitutions have occurred.       Amount and/or Complexity of Data Reviewed  Labs: ordered.    Risk  Prescription drug management.             Disposition  Final diagnoses:   Accidental drug overdose, initial encounter     Time reflects when diagnosis was documented in both MDM as applicable and the Disposition within this note       Time User Action Codes Description Comment    6/8/2024  3:14 AM Amarilys Hill Add [T50.901A] Accidental drug overdose, initial encounter           ED Disposition       ED Disposition   Discharge    Condition   Stable    Date/Time   Sat Jun 8, 2024 0314    Comment   Irene Fernández discharge to home/self care.                   Follow-up Information    None         Discharge Medication List as of 6/8/2024  3:14 AM        CONTINUE these medications which have NOT CHANGED    Details   lamoTRIgine (LaMICtal) 100 mg tablet Take 200 mg by mouth daily , Historical Med      naproxen (NAPROSYN) 500 mg tablet Take 1 tablet (500 mg total) by mouth every 12 (twelve) hours as needed for mild pain or moderate pain, Starting Tue 7/28/2020, Print             No discharge procedures on file.    PDMP Review       None            ED Provider  Electronically Signed by             Amarilys Hill PA-C  06/08/24 0659    "

## 2025-01-24 ENCOUNTER — TELEPHONE (OUTPATIENT)
Dept: NEUROLOGY | Facility: CLINIC | Age: 35
End: 2025-01-24

## 2025-02-04 ENCOUNTER — CONSULT (OUTPATIENT)
Dept: NEUROLOGY | Facility: CLINIC | Age: 35
End: 2025-02-04
Payer: COMMERCIAL

## 2025-02-04 VITALS
HEIGHT: 64 IN | HEART RATE: 81 BPM | SYSTOLIC BLOOD PRESSURE: 122 MMHG | BODY MASS INDEX: 27.39 KG/M2 | RESPIRATION RATE: 14 BRPM | WEIGHT: 160.4 LBS | DIASTOLIC BLOOD PRESSURE: 72 MMHG | OXYGEN SATURATION: 98 % | TEMPERATURE: 97.4 F

## 2025-02-04 DIAGNOSIS — G40.119 INTRACTABLE FOCAL EPILEPSY (HCC): Primary | ICD-10-CM

## 2025-02-04 PROBLEM — G70.00 MYASTHENIA GRAVIS (HCC): Status: ACTIVE | Noted: 2017-09-22

## 2025-02-04 PROBLEM — F33.42 RECURRENT MAJOR DEPRESSIVE DISORDER, IN FULL REMISSION (HCC): Status: ACTIVE | Noted: 2022-10-17

## 2025-02-04 PROBLEM — M54.12 CERVICAL RADICULOPATHY: Status: ACTIVE | Noted: 2021-06-30

## 2025-02-04 PROBLEM — E78.5 DYSLIPIDEMIA: Status: ACTIVE | Noted: 2017-10-03

## 2025-02-04 PROBLEM — I65.1 BASILAR ARTERY STENOSIS: Status: ACTIVE | Noted: 2017-09-22

## 2025-02-04 PROBLEM — M54.50 LOW BACK PAIN: Status: ACTIVE | Noted: 2020-06-12

## 2025-02-04 PROCEDURE — 99245 OFF/OP CONSLTJ NEW/EST HI 55: CPT | Performed by: PSYCHIATRY & NEUROLOGY

## 2025-02-04 PROCEDURE — G2211 COMPLEX E/M VISIT ADD ON: HCPCS | Performed by: PSYCHIATRY & NEUROLOGY

## 2025-02-04 RX ORDER — ZONISAMIDE 100 MG/1
CAPSULE ORAL
COMMUNITY
Start: 2024-11-08 | End: 2025-02-04

## 2025-02-04 RX ORDER — LAMOTRIGINE 200 MG/1
200 TABLET ORAL 2 TIMES DAILY
Qty: 60 TABLET | Refills: 5 | Status: SHIPPED | OUTPATIENT
Start: 2025-02-04

## 2025-02-04 RX ORDER — NORTRIPTYLINE HYDROCHLORIDE 25 MG/1
25 CAPSULE ORAL
COMMUNITY
Start: 2024-11-18 | End: 2025-02-04

## 2025-02-04 RX ORDER — FOLIC ACID 1 MG/1
2 TABLET ORAL DAILY
COMMUNITY
Start: 2024-11-12 | End: 2025-02-04

## 2025-02-04 RX ORDER — LAMOTRIGINE 100 MG/1
100 TABLET ORAL 2 TIMES DAILY
Qty: 60 TABLET | Refills: 5 | Status: SHIPPED | OUTPATIENT
Start: 2025-02-04

## 2025-02-04 RX ORDER — ZONISAMIDE 100 MG/1
CAPSULE ORAL
Qty: 120 CAPSULE | Refills: 5 | Status: SHIPPED | OUTPATIENT
Start: 2025-02-04

## 2025-02-04 RX ORDER — FOLIC ACID 1 MG/1
1 TABLET ORAL DAILY
Qty: 90 TABLET | Refills: 3 | Status: SHIPPED | OUTPATIENT
Start: 2025-02-04

## 2025-02-04 NOTE — PATIENT INSTRUCTIONS
Intractable focal epilepsy  1 - continue with Lamotrigine 300mg twice a day (take one each 100mg and 200mg tab twice a day)  2 - increase Zonisamide 100mg capsule take one cap in the morning and two caps at bedtime for 2 weeks, then go to two caps (200mg) twice a day  3 - call the office in 1 month to report if you are still having focal seizures (auras) then will increase your dose of zonisamide up to 300mg twice a day  4 - in 1 month get blood work for CMP, zonisamide and lamotrigine  High doses of zonisamide can cause kidney insufficiency, kidney stones, inability to sweat, fevers, cognitive impairment, depression, suicidal ideation, anorexia, and weight loss   5 - keep a seizure calendar  6 - MRI brain w/wo contrast for intractable seizures  7 - CT PET brain imaging study  8 - routine EEG study  9 - will try to obtain EMU study from [facility] for review  10 - continue with folic acid 1mg daily  11 - follow-up in 4 months    Women and Epilepsy: Birth Control    Having epilepsy does not mean that you should not have children.  However, certain antiseizure medication can increase the risk of congenital malformations (neural tube defects), future cognitive deficits, and/or  labor.  The vast majority of women who have epilepsy or take antiseizure medications still have normal healthy babies.  It is recommended that women with epilepsy have a discussion with their doctor in planning for a healthy pregnancy and birth.    See patient information handout called “Pregnancy and Epilepsy” for more information.    What can I do to avoid getting pregnant?  All available birth control methods can be used by people with epilepsy. These include:  Barrier devices: diaphragms, condoms, cervical caps, often used with spermicidal creams  Intrauterine contraceptives and devices (IUDs / IUCs)  and coils:  Includes the Mirena and Paraguard coils  Hormonal contraception: birth control pills, hormone implants, or hormone injections,  vaginal rings and birth control patches  Surgical procedures: vasectomy (for men) and bilateral tubal ligation (“getting your tubes tied”)    Is a tubal ligation right for me?   Remember, surgical procedures are permanent  If you have decided that you never want to have children or you do not want any more children, you can talk to your doctor about an operation called a tubal ligation.   This procedure is the most secure way to ensure that you will never become pregnant.   If you are in a monogamous relationship (you only have one male partner) he can have a similar operation, a vasectomy.   This would not protect you from getting pregnant from other men.   Any permanent procedure is a serious decision and should be discussed with your doctors.    Should I get an intrauterine device (IUD)?  Intrauterine devices, such as Mirena and Paraguard coils, are also very effective forms of contraception.    These devices are placed by your gynecologist and are generally effective for several years (up to 5 years).    Also, they are temporary.  If you decide that you want to become pregnant, the ability to become pregnant returns quickly once the device is removed.       What about hormonal contraception?  Hormonal contraception is also a reliable method of birth control for most women, but needs special consideration in women with epilepsy.  For all women, it is % effective, and may even be less effective for women with epilepsy.  Some birth control pills can interact with some seizure medications making the seizure medications less effective.  Some seizure medications increase the breakdown of contraceptive hormones, making birth control pills less effective. Medications that can decrease effectiveness of birth control are:  Carbamazepine (Tegretol, Carbatrol)  Clobazam (Onfi)  Eslicarbazepine (Aptiom)  Felbamate (Felbatol)  Lamotrigine (Lamictal) (at doses > 300 mg per day)  Oxcarbazepine (Trileptal)  Perampanel  (Fycompa)  Phenytoin (Dilantin)  Phenobarbital (Luminal)  Primidone (Mysoline)  Rufinamide (Banzel)  Topiramate (Topamax) (at doses > 200 mg per day)  If you are taking any of these medications, it is important to use a second method of birth control since your regular birth control pill may not be as effective.  This is especially true for “low dose estrogen” birth control pills, which are a more commonly prescribed type of birth control.   The amount of estrogen in this circumstance should be at least 50 micrograms of ethinyl estradiol (estrogen).  Valproate (Depakote) and Felbamate (Felbatol) can potentially increase levels of hormonal contraceptives  With these medications, your dose of birth control may need to be adjusted.     What about other forms of hormonal birth control?  Hormonal implants (placed under the skin), birth control patches, and the vaginal ring may also be affected by the medications listed above. As a result, with the above medications, these methods may also be less effective.   Like with oral hormonal contraceptives (the birth control pill), it is a good idea to use a second barrier method of contraception in addition, such as a diaphragm, a spermicidal cream, or through use of a condom.  Medroxyprogesterone (Depo-Provera) is a hormonal injection used for birth control.    Although it is still effective in women on the medications listed above, it is generally advisable to give it every 10 weeks instead of every 12 weeks to decrease the risk of unplanned pregnancy.      Special consideration for Lamotrigine (Lamictal):  Hormonal contraceptives, especially oral pills, can lower Lamotrigine (Lamictal) blood levels and therefore make it less effective.   If you are starting or planning to start an oral contraceptive pill and are taking Lamotrigine (Lamictal), please talk to your neurologist.     How do I know which method is best for me?  This is an individual decision and is best answered by  having a discussion of your personal situation with your gynecologist and your neurologist.     Will my seizure pattern change if I use hormonal birth control?  Some women have seizures that are associated with their menstrual cycle.    When taking hormonal birth control, some women can notice a change in their seizure frequency, most women do not notice a change in their seizures  Remember, hormonal birth control can lower Lamotrigine (Lamictal) levels, and by doing so make seizures more likely.   If you notice a change in your seizure frequency, call your doctor.     Can I use the “Morning After Pill” with my epilepsy medications?  The “morning after pill” is a form of emergency contraception used after unprotected sex.    It is more effective if it is used soon after sex.    The morning after pill is a hormonally based medication, and can be less effective by enzyme-inducing seizure medications  including:    Carbamazepine (Tegretol, Carbatrol)  Clobazam (Onfi)  Eslicarbazepine (Aptiom)  Felbamate (Felbatol)  Lamotrigine (Lamictal) (at doses > 300 mg per day)  Oxcarbazepine (Trileptal)  Perampanel (Fycompa)  Phenytoin (Dilantin)  Phenobarbital (Luminal)  Primidone (Mysoline)  Rufinamide (Banzel)  Topiramate (Topamax) (at doses > 200 mg per day)  If you are taking any of these medications, please let the pharmacist and your gynecologist know. You likely will need a higher dose of the emergency contraception (typically twice as much) for it to be effective.  Additional information on birth control methods can be found at:  [facility]:  http://www.plannedparenthood.org/  Ep[facility]psy [facility]:  https://www.epilepsy.com/living-epilepsy/women/all-women/contraception    Refractory (Intractable) Epilepsy and Other Therapeutic Options    What is refractory or intractable epilepsy?  Refractory epilepsy is when seizures do not become controlled quickly with seizure medications.  Seizures in these patients are  frequent and severe enough that they are troublesome and interfere with life.   Medically refractory epilepsy occurs when a patient continues to have seizures despite reasonable doses of at least 2 seizure medications.  A third of patients with epilepsy are considered to have refractory epilepsy.   Patients with refractory epilepsy or seizures should be referred to a comprehensive epilepsy center for evaluation.    What are the reasons for uncontrolled seizures (refractory epilepsy)?  The diagnosis is wrong.  The treatment was wrong: some medicines are not right for some seizure types or high enough doses were not reached.  Triggers or lifestyle factors that affect seizure control:  Missing medications or inadequate doses of medications  Drugs and alcohol  Poor sleep  Some properly diagnosed epilepsies are difficult to treat and do not respond well to medications.    Could the diagnosis be wrong?  There are other medical conditions that can cause episodes that look very similar to epileptic seizures (examples are syncope, sleep disorders, hypoglycemia, and psychiatric causes; but there are others).   When seizures are not quickly controlled by seizure medications, one has to consider the possibility that these may not be epileptic seizures.  Doctors may request additional testing to re-evaluate if alternative causes are possible, such as having the patient admitted to the Epilepsy Monitoring Unit (EMU) to capture and better diagnose the seizures.   The EMU has the capability of continuous video EEG monitoring with 24/7 monitoring by experienced EEG technologists and nurses trained in the management of seizures.  Epileptic seizures are best determined by the combination of continuous video and EEG recording.  If a patient's events are found not to be seizures, the treatment needs to be focused on the true cause of the events.     What treatments are available for refractory epilepsy?  Anti-seizure drugs  (anti-epileptic drugs or seizure medications)  There are more than 20 different seizure medications available.  Medications are used individually or in combination  Depending on the type of epilepsy, some medications are more appropriate than others  Every person with epilepsy is different and respond differently to medications  Always take your medication regularly and as instructed by your physician  One of the most common cause of recurrent seizures is not taking anti-seizure drugs on a regular basis and forgetting doses of medications  There are medication interactions that may make anti-seizure drugs ineffective, lower availability, or worsen toxicity or side effects; always speak to your provider and pharmacist about medication interactions.  Alcohol can interact with your medications, make them less effective or worsen side effects  Some medications may make seizures worse in one epilepsy type than others  Do not abruptly stop taking your anti-seizure medication(s) without consulting your provider first    Epilepsy Surgery  Surgery is a treatment option for persons with intractable epilepsy and is not a “last resort”.  For mesial temporal epilepsy seizure freedom can be achieved in 60-80% of patients; compared to medication trials less than 10% of patients.    Epilepsy surgery has been performed for more than 100 years.    For some people, surgery can lead to seizure freedom or lower seizure severity and frequency.  More than half of the people who have surgery will get better control of their seizures.  Epilepsy surgery is often recommended when there is significant risk for sudden unexpected death in epilepsy patients (SUDEP).  Epilepsy surgery intervention is often recommended if a seizure focus (epileptogenic zone) can be identified and removal of the seizure focus results in minimal or no deficits to the patient.  The goal of epilepsy surgery is seizure freedom but not to discontinue taking anti-seizure  drugs.  In fact, most epilepsy surgery patients continue to take anti-seizure drugs, but may have a reduction in the number of medications.  Examples of epilepsy surgery include:   Lesionectomy - if an identifiable abnormality can be removed such as a cavernoma, developmental malformation, or mesial temporal sclerosis).  Less invasive option with laser interstitial thermal therapy (JALEESA) is a new procedure used for small seizure focus.  Lobectomy - removal a part of the brain where seizures originate (temporal or frontal lobes)  disconnecting a larger area of the brain to prevent spread of the seizure.  Disconnection - hemispherectomy or corpus callosotomy; offered to individuals when more extensive epileptogenic tissue or cortex is involved.     Neuromodulatory Devices  These are implanted medical devices to reduce the severity and frequency of seizures.    Although a small minority of persons with epilepsy do achieve seizure freedom with these medical devices, the expectation is fewer seizures, less severe seizures, and reduced risk of SUDEP.  These medical devices are appropriate for persons with refractory epilepsy and are not epilepsy surgery candidates for resection or the patient forgoes direct surgical intervention.  These medical devices have similar efficacy and selection of these devices are based on seizure onset, seizure circuitry, risk of surgery, and patient preference.  These devices are implanted by a neurosurgeon and programmed by the neurologist.    Vagus Nerve Stimulation (VNS Therapy by [employer])     This is an implantable device that is placed in the chest and a thin wire (lead) connected to the left vagus nerve.  The vagus nerve is found next to the carotid pulse on the neck.  This device gives regular impulses (stimulates) the vagus nerve.   This device is approved for adults and children 4 years and older with intractable focal seizures.  It is unknown how the regular stimulation of the  vagus nerve reduces seizure frequency, but it is thought to interrupt abnormal synchronous neuronal activity of the seizure circuit.  The VNS therapy has been approved by the FDA for use in 1997 for refractory epilepsy.    Responsive Neurostimulation (RNS, by [employer])     This is a medical device that is implanted on the skull connected with electrode wires directly into the brain that involves the seizure circuit.    This implanted device monitors and records electrical activity brain, detect seizures and respond quickly by sending electrical impulses of stimulation to try to stop the seizure.  Patients with focal epilepsy who are not candidates for resective surgery may benefit from this device.  Patients will have to undergo intracranial EEG monitoring to identify the seizure onset area or epileptogenic zone so that the electrode wires can be placed.  The RNS system has been approved by the FDA for use in 2013 for adults ages 18 and older.    Deep brain stimulation (DBS) for epilepsy (DBS therapy, by [employer])    This medical device has a battery generator that is implanted in to the left chest and wires that goes into the brain, typically part of the brain that is involved in the spread of the seizure, such as the anterior nucleus of the thalamus.  Unlike epilepsy surgery or for the RNS device, precise knowledge of the seizure onset zone or epileptogenic focus is not required.  This is a type of neuromodulation to change how areas of the brain interacts and generate seizures.  DBS for epilepsy has been approved by the FDA for use in 2018.      Dietary therapy  Ketogenic diet, modified Atkins diet, medium chain triglycerides diet, low glycemic index  For some persons with epilepsy, dietary therapy may be more appropriate, especial for those with metabolic disorders or genetic conditions.  These diets have been used in persons without metabolic disorders, too.  To be done correctly, patients will need an  evaluation by a nutritionist or dietician who is familiar with these diets.  A useful resource can be found at the [facility] ([email or web address]).    What are the goals of treating refractory epilepsy?  The goal for treating all patients with epilepsy is no seizures and no side effects.  Making sure that the patient is given the correct diagnosis, so that the most appropriate therapy and treatment plan is offered.  Reduce the risk of injury, accidents, cognitive problems, and SUDEP  Improve quality of life with control of epilepsy    Working with your Epilepsy Team is important to minimize any risks and work toward seizure freedom!

## 2025-02-04 NOTE — PROGRESS NOTES
[facility]  Name: Irene Fernández      : 1990      MRN: [id number]  Encounter Provider: Camille Delgado MD  Encounter Date: 2025    Encounter department: [address]  [facility]  [address]  [facility] [place] [id number]  Visit Type: consultation  Referring MD / PCP:  No primary care provider on file.     Assessment & Plan  Intractable focal epilepsy (HCC)  1 - continue with Lamotrigine 300mg twice a day (take one each 100mg and 200mg tab twice a day)  2 - increase Zonisamide 100mg capsule take one cap in the morning and two caps at bedtime for 2 weeks, then go to two caps (200mg) twice a day  3 - call the office in 1 month to report if you are still having focal seizures (auras) then will increase your dose of zonisamide up to 300mg twice a day  4 - in 1 month get blood work for CMP, zonisamide and lamotrigine  High doses of zonisamide can cause kidney insufficiency, kidney stones, inability to sweat, fevers, cognitive impairment, depression, suicidal ideation, anorexia, and weight loss   5 - keep a seizure calendar  6 - MRI brain w/wo contrast for intractable seizures  7 - CT PET brain imaging study  8 - routine EEG study  9 - will try to obtain EMU study from [facility] for review  10 - continue with folic acid 1mg daily  11 - follow-up in 4 months       Assessment:  Ms. Irene Fernández is a 34 y.o. woman with intractable focal epilepsy.  She continues to have focal impaired aware seizures.  Seizures involve sensori-motor symptoms involving the right hand-head region.  Prior EEG monitoring study at [facility], demonstrated rapid spread of electrographic seizure from either the left temporal or parasagittal region to left hemisphere or left occipital region.  This suggest the involvement of the left sensorimotor cortices such as SSMA, primary sensori-motor, or opercular-insular regions.  Prior MRI brain reports did not  indicate the presence of a structural lesion.  We will need these prior studies to review.    I reviewed intractable epilepsy with the patient.  Chances of seizure freedom with antiseizure medication alone is generally less than 15% after three antiseizure medications.  We may consider further surgical evaluation.  We will need an MRI brain study to be done on a 3T scanner along with PET/CT brain metabolic imaging.  She will need neuropsychological evaluation however, her primary language is Romanian and this evaluation may not be available in the local region.    Options for this patient depends on if there is a lesion that is in a surgically accessible cortex, less likely to involve eloquent cortex, and limited epileptogenic focus.    For now I recommend maximizing her dose of zonisamide first.  Once the data is available we will re-assess if she requires intracranial monitoring to determine if she is a candidate for lesion ablation, RNS, DBS, or VNS therapy.    I discussed seizure safety and seizure first aid with the patient.  Limits would be no unprotected heights (ladders, standing on chairs/tables), should not swim alone (need partners or ), cooking with a partner to avoid burn injuries, showers instead of baths.  I reviewed that convulsive seizures typically last about 2 minutes with about 15-30 minutes of recovery.  Should a seizure last more than 5 minutes or patient fails to recover after 30 minutes or there are multiple seizures in a day or if there is a suspected head injury then EMS should be called or they go to the nearest emergency room.  If she only experiences altered awareness, confusion, or focal seizures, then they may call the office for guidance.  Seizure first aid consists of preventing the patient from wandering or removal of dangerous objects or prevention of injury, for convulsive seizures, position the patient on the ground, may place a pillow under the head, turn the patient to  "her side, no objects or reaching for the mouth, no restraints but prevent injuries by removing glasses or sharp/heavy objects, and time the duration of the seizure.  I recommend that she obtain a medical alert bracelet that states \"Seizure disorder\" or \"Epilepsy\" along with any medication allergies.    We discussed issues related to driving.  I explained to the patient that the law states that all patients who have a seizure must be reported to the [place].  Patients cannot legally drive for 6 months after seizure in the state of [place] (6 months in the state of [place] and 3 months in the state of [place].)  She is not cleared to return to driving until she has been without a seizure for at least 6 months and cleared by the appropriate state DMV/DOT.  I also informed the patient that, although exceptions can be granted for patients with provoked seizures, exclusively nocturnal seizures, prolonged auras, or exclusively simple partial seizures, these exceptions are granted by the [place] and not by the physician.       Plan:   Intractable focal epilepsy  1 - continue with Lamotrigine 300mg twice a day (take one each 100mg and 200mg tab twice a day)  2 - increase Zonisamide 100mg capsule take one cap in the morning and two caps at bedtime for 2 weeks, then go to two caps (200mg) twice a day  3 - call the office in 1 month to report if you are still having focal seizures (auras) then will increase your dose of zonisamide up to 300mg twice a day  4 - in 1 month get blood work for CMP, zonisamide and lamotrigine  High doses of zonisamide can cause kidney insufficiency, kidney stones, inability to sweat, fevers, cognitive impairment, depression, suicidal ideation, anorexia, and weight loss   5 - keep a seizure calendar  6 - MRI brain w/wo contrast for intractable seizures  7 - CT PET brain imaging study  8 - routine EEG study  9 - will try to obtain EMU study from [facility] for review  10 - continue with " folic acid 1mg daily  11 - follow-up in 4 months          Chief Complaint:    Chief Complaint   Patient presents with    New Patient Visit    Seizures      HPI:      Irene Fernández is a 34 y.o. right handed female here for consultation evaluation of seizure disorder.  She was previously evaluated by [facility] Dr. Diaz  The following is from interviewing the patient and review of the available office/hospital notes.    Intake History 2/4/2025  She was previously seen by Nano Alberto and Dr. Jeanine Diaz at [facility] regarding seizures and headaches.  She was on lamotrigine 300-400 for seizure and nortriptyline for headaches.  She had an EMU Study which captured several events with EEG correlation.  She was started on carbamazepine.  She reported that she continued to have daily seizures (up to three per day).  There was no improvement with carbamazepine.  She was then started on zonisamide.    She described auras of right arm paresthesias, like the arm is not her arm, pulsatile sensation of the head, lasting up to one minute.  Auras can happen up to 7 times a day.    She has daily headaches, mostly at night.  Headaches are right frontal and heavy sensation on the left posterior region, daily, no nausea, no photophobia, no phonophobia.  She is also diagnosed with myasthenia when she was 18 years old, which was diagnosed in 2008 in the [place].  She had a thymectomy in 2009.  She is not on medication.  (She had a history of difficulty swallowing, chewing, ptosis, double vision.  She was previously on Mestinon.)  She reported that she has had generalized seizures (11/2022).  She reported a history of nocturnal epilepsy since 13 years of age.  She had generalized tonic-clonic seizures.    Sosa [id number] ()  She started to have seizures when she was 13 years old.    The first time when she had a seizure, she had a strong feeling, she cried, went to sleep and then  woke up she found out that she was in the hospital.  She was unconscious with a lot of movement, foaming at the mouth.  She cannot remember her first seizure from so many years ago, maybe whole body shaking.  She recalled that she was crying because she had a fight with her brother.  She says that she has two type of seizures, both started when she was 13 years old.  The aura seizure may have stopped for a while before they returned.    1 - aura - she starts with a feeling over the right arm and right side of her head (she cannot explain it), she feels that the sensation is out of her body, she feels that her right hand is immobilized, she is unable to answer, she acts like an idiot (confused, spacey) but she is able to remember the seizure/aura.  These seizures happen about 3 times in a day or it may not happen.  These are more triggered by stress.    2 - sleep related seizure - she cannot remember what happens when she has a sleep related seizure.  Last time it happened was in August 2024.  From Georgian interpretation she used the term a lot of movements, but the interpretation is whole body shaking.  There is no warning to the aura type of seizure.  In the last year, the sleep related seizure happened about 3 times.    She reports that she has daily seizures.  One seizure is triggered if she is under a lot of stressed  She cannot go to [employer] because the excitement  triggers her auras.    She was previously on carbamazepine which did not work.  She believes that the lamotrigine help with the sleep related seizures but not for the aura.  She has been on zonisamide 200mg at bedtime for a couple of months but it has not reduced the frequency of her aura seizures.      She reports that she generally does not have headaches.      AED/side effects/compliance:  Lamotrigine 300-300  Zonisamide 200 qHS    Event/Seizure semiology:  Sleep related generalized tonic clonic activity (described as whole body  shaking)  Focal sensori-motor impaired aware seizure (auras) - feeling over the right arm-head region, sensation out of her body, right hand is immobilized, unable to answer, confused and spacey.      Woman of childbearing age with Epilepsy:  Contraception: not currently active but would like to have more children in the future  Folic acid supplement:  No    Prior Epilepsy History:      Special Features  Status epilepticus: No  Self Injury Seizures: No  Precipitating Factors: stress, excitement ([employer])  Post-ictal state: None    Epilepsy Risk Factors:  Abnormal pregnancy: No  Abnormal birth/: No  Abnormal Development: No  Febrile seizures, simple: No  Febrile seizures, complex: No  CNS infection: No  Intellectual disability: No  Cerebral palsy: No  Head injury (moderate/severe): No  CNS neoplasm: No  CNS malformation: No  Neurosurgical procedure: No  Stroke: No  CNS autoimmune disorder: No  Alcohol abuse: No  Drug abuse: No  Family history Sz/epilepsy: No    Prior AEDs:  medication Max dose Time used Reason to stop   carbamazepine      zonisamide      lamotrigine        Prior workup:  No radiology study reviewed during this visit  Imagin2017 - [facility]  MRI brain  Suboptimal exam due to dental braces  DWI high signal intensity in the right paramedian maribel (acute/subacute infarction)    2017 - [facility]  MRV head - no flow is seen in the left transverse sinus which may be due to hypoplasia/aplasia of the left transverse sinus with dominant right transverse sinus.    11/15/2017 - [facility]  CTA head/neck  Intracranial vertebral arteries and basilar artery are small in caliber    2/10/2022 - [facility]  MRI brain w/wo  Foci T2/FLAIR hyperintensity in the cerebral white matter    EEGs:  2017 - [facility]  Normal awake, drowsy, and asleep    2/ - [facility]   48 hours ambulatory EEG study  Normal awake and asleep EEG  Six episodes of dizziness have normal awake EEG activity    10/9-10/11/2023 - [facility]  "  LTM study for daily \"aura\" with severe dizziness, abnormal feeling in the right hand (like it is moving and heavy)  Normal awake and asleep EEG  Event \"30 seconds of visual disturbance, unable to focus her eyes\" - artifact  Event reported numbness of the hands and feet - 3-5 Hz semirhythmic left temporal slowing with 2.5-3 Hz spike and wave complexes in the left occipital region for 21 seconds - patient pressed the event button a minute after the electrographic seizure  Event right arm and then left arm sensation - fast activity in the left parasagittal region, then left temporal and occipital regions with sharp waves on the left occipital region evolving to 2.5 Hz spike-wave complexes.  These events are suggestive of focal aware seizures, with onset in the left parasagittal region spreading to the left posterior temporal and occipital regions (semiology - undefined sensation in the right arm)  There are infrequent sharp waves in the left more than right temporal region.    Labs:  Component      Latest Ref Rng 6/8/2024   WBC      4.31 - 10.16 Thousand/uL 7.51    RBC      3.81 - 5.12 Million/uL 4.46    Hemoglobin      11.5 - 15.4 g/dL 13.0    Hematocrit      34.8 - 46.1 % 40.8    Platelet Count      149 - 390 Thousands/uL 279    Sodium      135 - 147 mmol/L 138    Potassium      3.5 - 5.3 mmol/L 3.9    Chloride      96 - 108 mmol/L 106    Carbon Dioxide      21 - 32 mmol/L 22    ANION GAP      4 - 13 mmol/L 10    BUN      5 - 25 mg/dL 15    Creatinine      0.60 - 1.30 mg/dL 0.80    GLUCOSE      65 - 140 mg/dL 88    Calcium      8.4 - 10.2 mg/dL 9.9    AST      13 - 39 U/L 14    ALT      7 - 52 U/L 8    ALK PHOS      34 - 104 U/L 42    Total Protein      6.4 - 8.4 g/dL 7.6    Albumin      3.5 - 5.0 g/dL 4.5    Total Bilirubin      0.20 - 1.00 mg/dL 0.36    GFR, Calculated      ml/min/1.73sq m 97    TSH 3RD GENERATON      0.450 - 4.500 uIU/mL 2.112    Total CK      26 - 192 U/L 100    MAGNESIUM      1.9 - 2.7 mg/dL " "1.9          General exam   /72 (BP Location: Right arm, Patient Position: Sitting, Cuff Size: Adult)   Pulse 81   Temp (!) 97.4 °F (36.3 °C) (Temporal)   Resp 14   Ht 5' 4\" (1.626 m)   Wt 72.8 kg (160 lb 6.4 oz)   SpO2 98%   BMI 27.53 kg/m²    Appearance: normally developed, atraumatic  Carotids:  no bruits present  Cardiovascular: regular rate and rhythm and normal heart sounds  Pulmonary: clear to auscultation  Abdominal: soft, nondistended  Extremities: no edema    HEENT: anicteric and moist mucus membranes / oral cavity   Fundoscopy: bilateral optic discs are sharp    Mental status  Orientation: alert and oriented to name, place, time  Fund of Knowledge: intact   Attention and Concentration:  names the days of the week backwards  Current and Remote Memory:recalled 3/3 words after five minutes  Language: spontaneous speech is normal and comprehension is intact    Cranial Nerves  CN 1: not tested  CN 2: Visual fields intact to confrontation and pupils equal round reactive to direct and consenual light   CN 3, 4, 6: EOMI, no nystagmus  CN 5:sensation intact to all distribution V1, V2, V3  CN 7:muscles of facial expression are symmetric  CN 8:symmetric to finger rubs bilaterally  CN 9, 10:no dysarthria present  CN 11:symmetric strength of sternocleidomastoid and trapezius muscles  CN 12:tongue is midline    Motor:  Bulk, Tone: normal bulk, normal tone  Pronation: no pronator drift  Strength: Patient has full strength symmetrically of shoulder abduction, biceps, triceps, wrist flexion, wrist extension, finger flexion, finger abduction, hip flexion, knee flexion, knee extension, dorsiflexion, plantar flexion.   Abnormal movements: no abnormal movements are present    Sensory:  Lighttouch: intact in all limbs  Romberg:normal    Coordination:  FNF:FNF bilaterally intact  MARYSOL:intact  FFM:intact  Gait/Station:normal gait and normal tandem gait    Reflexes:  DTR 2 out of 4 of the biceps, brachioradialis, " triceps, patellar, and Achilles bilateral    Past Medical/Surgical History:  Patient Active Problem List   Diagnosis    Basilar artery stenosis    Cervical radiculopathy    Dyslipidemia    Low back pain    Recurrent major depressive disorder, in full remission (HCC)    Myasthenia gravis (HCC)     Past Medical History:   Diagnosis Date    Epilepsy (HCC)     Myasthenia gravis (HCC)      Past Surgical History:   Procedure Laterality Date    TOTAL THYMECTOMY       Past Psychiatric History:  Depression: No  Anxiety: No  Psychosis: No    Medications:    Current Outpatient Medications:     folic acid (FOLVITE) 1 mg tablet, Take 2 mg by mouth daily, Disp: , Rfl:     nortriptyline (PAMELOR) 25 mg capsule, Take 25 mg by mouth (Patient not taking: Reported on 2/4/2025), Disp: , Rfl:     zonisamide (ZONEGRAN) 100 mg capsule, Take one tablet day x 2 weeks, then two tablets daily, Disp: , Rfl:     lamoTRIgine (LaMICtal) 100 mg tablet, Take 200 mg by mouth daily , Disp: , Rfl:     naproxen (NAPROSYN) 500 mg tablet, Take 1 tablet (500 mg total) by mouth every 12 (twelve) hours as needed for mild pain or moderate pain (Patient not taking: Reported on 2/4/2025), Disp: 15 tablet, Rfl: 0    Allergies:  No Known Allergies    Family history:  History reviewed. No pertinent family history.  There is no family history of seizure, epilepsy or developmental delay.      Social History  Living situation: lives with a child  Work:  not assessed, prior notes referred her being a   Driving:  she reports that she is not driving   reports that she has never smoked. She has never used smokeless tobacco. She reports that she does not drink alcohol and does not use drugs.    PHQ-2/9 Depression Screening    Little interest or pleasure in doing things: 0 - not at all  Feeling down, depressed, or hopeless: 0 - not at all  Trouble falling or staying asleep, or sleeping too much: 0 - not at all  Feeling tired or having little energy: 0 - not at  all  Poor appetite or overeatin - not at all  Feeling bad about yourself - or that you are a failure or have let yourself or your family down: 0 - not at all  Trouble concentrating on things, such as reading the newspaper or watching television: 0 - not at all  Moving or speaking so slowly that other people could have noticed. Or the opposite - being so fidgety or restless that you have been moving around a lot more than usual: 0 - not at all  Thoughts that you would be better off dead, or of hurting yourself in some way: 0 - not at all  PHQ-9 Score: 0  PHQ-9 Interpretation: No or Minimal depression          Decision making was of high-complexity due to the patient's high risk condition (seizures), psychiatric and neuropsychological comorbidities, behavioral problems, memory and cognitive problems and medication side effects.      The total amount of time spent with the patient along with pre-chart and post-chart preparation was 93 minutes on the calendar day of the date of service.  This included history taking, physical exam, review of ancillary testing, counseling provided to the patient regarding diagnosis, medications, treatment, and risk management, and other communication to the patient's providers and/or family.  Start time: 1PM  End time: 2:33PM

## 2025-02-09 PROBLEM — G70.00 MYASTHENIA GRAVIS (HCC): Status: RESOLVED | Noted: 2017-09-22 | Resolved: 2025-02-09

## 2025-02-09 PROBLEM — G40.119 INTRACTABLE FOCAL EPILEPSY (HCC): Status: ACTIVE | Noted: 2025-02-09

## 2025-02-09 PROBLEM — H11.002 PTERYGIUM EYE, LEFT: Status: ACTIVE | Noted: 2025-02-09

## 2025-02-09 NOTE — ASSESSMENT & PLAN NOTE
1 - continue with Lamotrigine 300mg twice a day (take one each 100mg and 200mg tab twice a day)  2 - increase Zonisamide 100mg capsule take one cap in the morning and two caps at bedtime for 2 weeks, then go to two caps (200mg) twice a day  3 - call the office in 1 month to report if you are still having focal seizures (auras) then will increase your dose of zonisamide up to 300mg twice a day  4 - in 1 month get blood work for CMP, zonisamide and lamotrigine  High doses of zonisamide can cause kidney insufficiency, kidney stones, inability to sweat, fevers, cognitive impairment, depression, suicidal ideation, anorexia, and weight loss   5 - keep a seizure calendar  6 - MRI brain w/wo contrast for intractable seizures  7 - CT PET brain imaging study  8 - routine EEG study  9 - will try to obtain EMU study from [facility] for review  10 - continue with folic acid 1mg daily  11 - follow-up in 4 months

## 2025-02-16 ENCOUNTER — HOSPITAL ENCOUNTER (EMERGENCY)
Facility: HOSPITAL | Age: 35
Discharge: HOME | End: 2025-02-16
Attending: EMERGENCY MEDICINE | Admitting: EMERGENCY MEDICINE
Payer: COMMERCIAL

## 2025-02-16 ENCOUNTER — APPOINTMENT (EMERGENCY)
Dept: RADIOLOGY | Facility: HOSPITAL | Age: 35
End: 2025-02-16
Payer: COMMERCIAL

## 2025-02-16 VITALS
WEIGHT: 160.05 LBS | OXYGEN SATURATION: 99 % | BODY MASS INDEX: 27.47 KG/M2 | HEART RATE: 109 BPM | DIASTOLIC BLOOD PRESSURE: 69 MMHG | TEMPERATURE: 100.3 F | SYSTOLIC BLOOD PRESSURE: 109 MMHG | RESPIRATION RATE: 20 BRPM

## 2025-02-16 DIAGNOSIS — J10.1 INFLUENZA A: Primary | ICD-10-CM

## 2025-02-16 DIAGNOSIS — H66.91 RIGHT OTITIS MEDIA: ICD-10-CM

## 2025-02-16 LAB
FLUAV AG UPPER RESP QL IA.RAPID: POSITIVE
FLUBV AG UPPER RESP QL IA.RAPID: NEGATIVE
SARS-COV+SARS-COV-2 AG RESP QL IA.RAPID: NEGATIVE

## 2025-02-16 PROCEDURE — 71046 X-RAY EXAM CHEST 2 VIEWS: CPT

## 2025-02-16 PROCEDURE — 99284 EMERGENCY DEPT VISIT MOD MDM: CPT | Performed by: PHYSICIAN ASSISTANT

## 2025-02-16 PROCEDURE — 99284 EMERGENCY DEPT VISIT MOD MDM: CPT

## 2025-02-16 PROCEDURE — 87811 SARS-COV-2 COVID19 W/OPTIC: CPT | Performed by: PHYSICIAN ASSISTANT

## 2025-02-16 PROCEDURE — 87804 INFLUENZA ASSAY W/OPTIC: CPT | Performed by: PHYSICIAN ASSISTANT

## 2025-02-16 RX ORDER — AMOXICILLIN 500 MG/1
500 TABLET, FILM COATED ORAL 2 TIMES DAILY
Qty: 20 TABLET | Refills: 0 | Status: SHIPPED | OUTPATIENT
Start: 2025-02-16 | End: 2025-02-26

## 2025-02-16 RX ORDER — IBUPROFEN 600 MG/1
600 TABLET, FILM COATED ORAL EVERY 6 HOURS PRN
Qty: 20 TABLET | Refills: 0 | Status: SHIPPED | OUTPATIENT
Start: 2025-02-16

## 2025-02-16 RX ORDER — ACETAMINOPHEN 325 MG/1
650 TABLET ORAL EVERY 6 HOURS PRN
Qty: 30 TABLET | Refills: 0 | Status: SHIPPED | OUTPATIENT
Start: 2025-02-16

## 2025-02-16 RX ORDER — BENZONATATE 100 MG/1
100 CAPSULE ORAL EVERY 8 HOURS
Qty: 21 CAPSULE | Refills: 0 | Status: SHIPPED | OUTPATIENT
Start: 2025-02-16

## 2025-02-16 NOTE — DISCHARGE INSTRUCTIONS
DISCHARGE INSTRUCTIONS:    FOLLOW UP WITH YOUR PRIMARY CARE PROVIDER OR THE [facility]. MAKE AN APPOINTMENT TO BE SEEN.     TAKE MEDICATION AS PRESCRIBED. IF RASH, SHORTNESS OF BREATH OR TROUBLE SWALLOWING, STOP TAKING THE MEDICATION AND BE SEEN.     REST AND DRINK PLENTY OF FLUIDS.    IF SYMPTOMS WORSEN OR NEW SYMPTOMS ARISE, RETURN TO THE ER TO BE SEEN.

## 2025-02-16 NOTE — ED PROVIDER NOTES
Time reflects when diagnosis was documented in both MDM as applicable and the Disposition within this note       Time User Action Codes Description Comment    2/16/2025  1:13 PM RodriguezLola Add [J10.1] Influenza A     2/16/2025  1:22 PM Lola Rodriguez Add [H66.91] Right otitis media           ED Disposition       ED Disposition   Discharge    Condition   Stable    Date/Time   Sun Feb 16, 2025  1:13 PM    Comment   Irene Fernández discharge to home/self care.                   Assessment & Plan       Medical Decision Making  34y.o female presents to the ER for fever, chills, rhinorrhea/congestion, right ear pain, sore throat, chest pain when coughing and body aches for 4 days.  Patient is tachycardic but temperature is also 100.3. Otherwise vitals are stable. Patient is in no acute distress. On exam, right otitis media seen. No otitis externa. No TM perforation. No mastoid tenderness. Left ear appears normal. No signs of throat infection. Uvula is midline. No trouble swallowing or handling secretions. No neck swelling. Breathing is non-labored. No tachypnea or accessory muscle use. Lungs are clear. Heart is regular rhythm. Abdomen is not distended. DDX consists of but not limited to: viral syndrome, covid, flu, pneumonia, pneumothorax. Will check covid/flu test and CXR.     1314 - Informed patient I did not see any acute abnormalities on xray at this time and if the radiologist saw anything concerning when reading the xray, we would call to inform them. Patient agreeable. Also informed patient of +flu. Will discharge patient with medication for symptoms.    The management plan was discussed in detail with the patient at bedside and all questions were answered.  Prior to discharge, we provided both verbal and written instructions.  We discussed with the patient the signs and symptoms for which to return to the emergency department.  All questions were answered and patient was comfortable with the  plan of care and discharged to home.  Instructed the patient to follow up with the primary care provider and/or specialist provided and their written instructions.  The patient verbalized understanding of our discussion and plan of care, and agrees to return to the Emergency Department for concerns and progression of illness.    At discharge, I instructed the patient to:  -follow up with pcp  -take Tylenol or Motrin for pain or fever  -take Amoxicillin as prescribed for ear infection  -take Cepacol and Tessalon as prescribed  -rest and drink plenty of fluids  -return to the ER if symptoms worsened or new symptoms arose  Patient agreed to this plan and was stable at time of discharge.     Problems Addressed:  Influenza A: acute illness or injury  Right otitis media: acute illness or injury    Amount and/or Complexity of Data Reviewed  Independent Historian:      Details: Patient is historian  Labs: ordered. Decision-making details documented in ED Course.  Radiology: ordered and independent interpretation performed.    Risk  OTC drugs.  Prescription drug management.        ED Course as of 02/16/25 1401   Sun Feb 16, 2025   1303 Influenza A Rapid Antigen(!): Positive       Medications - No data to display    ED Risk Strat Scores                            SBIRT 20yo+      Flowsheet Row Most Recent Value   Initial Alcohol Screen: US AUDIT-C     1. How often do you have a drink containing alcohol? 0 Filed at: 02/16/2025 1150   2. How many drinks containing alcohol do you have on a typical day you are drinking?  0 Filed at: 02/16/2025 1150   3a. Male UNDER 65: How often do you have five or more drinks on one occasion? 0 Filed at: 02/16/2025 1150   3b. FEMALE Any Age, or MALE 65+: How often do you have 4 or more drinks on one occassion? 0 Filed at: 02/16/2025 1150   Audit-C Score 0 Filed at: 02/16/2025 1150   [facility]: How many times in the past year have you...    Used an illegal drug or used a prescription medication for  non-medical reasons? Never Filed at: 02/16/2025 1150                            History of Present Illness       Chief Complaint   Patient presents with    Flu Symptoms     C/o, headache, chills, body aches, fevers, back pain since Thursday. Pt has been taking ibuprofen and tylenol. Last does was this morning        Past Medical History:   Diagnosis Date    Epilepsy (HCC)     Myasthenia gravis (HCC)       Past Surgical History:   Procedure Laterality Date    TOTAL THYMECTOMY        History reviewed. No pertinent family history.   Social History     Tobacco Use    Smoking status: Never    Smokeless tobacco: Never   Vaping Use    Vaping status: Never Used   Substance Use Topics    Alcohol use: No    Drug use: No      E-Cigarette/Vaping    E-Cigarette Use Never User       E-Cigarette/Vaping Substances      I have reviewed and agree with the history as documented.     34y.o female with PMH of epilepsy and myasthenia gravis presents to the ER for fever, chills, rhinorrhea/congestion, right ear pain, sore throat, chest pain when coughing and body aches for 4 days. Patient has been taking Tylenol for symptoms. Symptoms are constant. She denies sick contacts or recent travel. She denies dyspnea, N/V/D, abdominal pain, weakness or paresthesias.      History provided by:  Patient   used: No        Review of Systems   Constitutional:  Positive for chills and fever. Negative for activity change and appetite change.   HENT:  Positive for congestion, ear pain, rhinorrhea and sore throat. Negative for drooling, ear discharge and facial swelling.    Eyes:  Negative for redness.   Respiratory:  Positive for cough. Negative for shortness of breath.    Cardiovascular:  Positive for chest pain (only when coughing).   Gastrointestinal:  Negative for abdominal pain, diarrhea, nausea and vomiting.   Musculoskeletal:  Positive for myalgias. Negative for neck stiffness.   Skin:  Negative for rash.   Allergic/Immunologic:  Negative for food allergies.   Neurological:  Negative for weakness and numbness.           Objective       ED Triage Vitals [02/16/25 1132]   Temperature Pulse Blood Pressure Respirations SpO2 Patient Position - Orthostatic VS   100.3 °F (37.9 °C) (!) 109 109/69 20 99 % Sitting      Temp Source Heart Rate Source BP Location FiO2 (%) Pain Score    Oral Monitor Right arm -- --      Vitals      Date and Time Temp Pulse SpO2 Resp BP Pain Score FACES Pain Rating User   02/16/25 1132 100.3 °F (37.9 °C) 109 99 % 20 109/69 -- -- ML            Physical Exam  Vitals and nursing note reviewed.   Constitutional:       General: She is not in acute distress.     Appearance: She is not toxic-appearing.   HENT:      Head: Normocephalic and atraumatic.      Right Ear: Tympanic membrane, ear canal and external ear normal. No drainage, swelling or tenderness. No foreign body. No mastoid tenderness. No hemotympanum. Tympanic membrane is not injected, perforated or erythematous.      Left Ear: Ear canal and external ear normal. No drainage, swelling or tenderness. No foreign body. No mastoid tenderness. No hemotympanum. Tympanic membrane is injected and erythematous. Tympanic membrane is not perforated.      Nose: Nose normal.      Mouth/Throat:      Lips: Pink. No lesions.      Mouth: Mucous membranes are moist.      Pharynx: Oropharynx is clear. Uvula midline. No pharyngeal swelling, posterior oropharyngeal erythema or uvula swelling.      Tonsils: No tonsillar exudate or tonsillar abscesses.   Eyes:      Conjunctiva/sclera: Conjunctivae normal.   Neck:      Trachea: Phonation normal. No tracheal deviation.   Cardiovascular:      Rate and Rhythm: Regular rhythm. Tachycardia present.      Heart sounds: Normal heart sounds, S1 normal and S2 normal. No murmur heard.     No friction rub. No gallop.   Pulmonary:      Effort: Pulmonary effort is normal. No respiratory distress.      Breath sounds: Normal breath sounds. No decreased breath  sounds, wheezing, rhonchi or rales.   Chest:      Chest wall: No tenderness.   Abdominal:      General: There is no distension.   Musculoskeletal:      Cervical back: Normal range of motion and neck supple.   Skin:     General: Skin is warm and dry.      Findings: No rash.   Neurological:      Mental Status: She is alert.      GCS: GCS eye subscore is 4. GCS verbal subscore is 5. GCS motor subscore is 6.   Psychiatric:         Mood and Affect: Mood normal.         Results Reviewed       Procedure Component Value Units Date/Time    FLU/COVID Rapid Antigen (30 min. TAT) - Preferred screening test in ED [[id number]]  (Abnormal) Collected: 02/16/25 1232    Lab Status: Final result Specimen: Nares from Nose Updated: 02/16/25 1257     SARS COV Rapid Antigen Negative     Influenza A Rapid Antigen Positive     Influenza B Rapid Antigen Negative    Narrative:      This test has been performed using the Fluoresentricidel Rajani 2 FLU+SARS Antigen test under the Emergency Use Authorization (EUA). This test has been validated by the  and verified by the performing laboratory. The Rajani uses lateral flow immunofluorescent sandwich assay to detect SARS-COV, Influenza A and Influenza B Antigen.     The Quidel Rajani 2 SARS Antigen test does not differentiate between SARS-CoV and SARS-CoV-2.     Negative results are presumptive and may be confirmed with a molecular assay, if necessary, for patient management. Negative results do not rule out SARS-CoV-2 or influenza infection and should not be used as the sole basis for treatment or patient management decisions. A negative test result may occur if the level of antigen in a sample is below the limit of detection of this test.     Positive results are indicative of the presence of viral antigens, but do not rule out bacterial infection or co-infection with other viruses.     All test results should be used as an adjunct to clinical observations and other information available to the  provider.    FOR PEDIATRIC PATIENTS - copy/paste COVID Guidelines URL to browser: [email or web address]            XR chest 2 views   ED Interpretation by Lola Rodriguez PA-C (02/16 1312)   No acute abnormalities seen by me at this time.      Final Interpretation by Justus Chu MD (02/16 1349)      No acute cardiopulmonary disease.            Workstation performed: [id number]             Procedures    ED Medication and Procedure Management   Prior to Admission Medications   Prescriptions Last Dose Informant Patient Reported? Taking?   folic acid (FOLVITE) 1 mg tablet   No No   Sig: Take 1 tablet (1 mg total) by mouth daily   lamoTRIgine (LaMICtal) 100 mg tablet   No No   Sig: Take 1 tablet (100 mg total) by mouth 2 (two) times a day With one 200mg tab   lamoTRIgine (LaMICtal) 200 MG tablet   No No   Sig: Take 1 tablet (200 mg total) by mouth 2 (two) times a day With one 100mg tab   zonisamide (ZONEGRAN) 100 mg capsule   No No   Sig: Take by mouth one cap in the morning and two caps at bedtime for 2 weeks, then two caps twice a day      Facility-Administered Medications: None     Discharge Medication List as of 2/16/2025  1:14 PM        START taking these medications    Details   acetaminophen (TYLENOL) 325 mg tablet Take 2 tablets (650 mg total) by mouth every 6 (six) hours as needed for mild pain or fever, Starting Sun 2/16/2025, Normal      benzonatate (TESSALON PERLES) 100 mg capsule Take 1 capsule (100 mg total) by mouth every 8 (eight) hours, Starting Sun 2/16/2025, Normal      ibuprofen (MOTRIN) 600 mg tablet Take 1 tablet (600 mg total) by mouth every 6 (six) hours as needed for mild pain or fever, Starting Sun 2/16/2025, Normal      menthol-cetylpyridinium (CEPACOL) 3 MG lozenge Take 1 lozenge (3 mg total) by mouth as needed for sore throat, Starting Sun 2/16/2025, Normal           CONTINUE these medications which have NOT CHANGED    Details    folic acid (FOLVITE) 1 mg tablet Take 1 tablet (1 mg total) by mouth daily, Starting Tue 2/4/2025, Normal      !! lamoTRIgine (LaMICtal) 100 mg tablet Take 1 tablet (100 mg total) by mouth 2 (two) times a day With one 200mg tab, Starting Tue 2/4/2025, Normal      !! lamoTRIgine (LaMICtal) 200 MG tablet Take 1 tablet (200 mg total) by mouth 2 (two) times a day With one 100mg tab, Starting Tue 2/4/2025, Normal      zonisamide (ZONEGRAN) 100 mg capsule Take by mouth one cap in the morning and two caps at bedtime for 2 weeks, then two caps twice a day, Normal       !! - Potential duplicate medications found. Please discuss with provider.        No discharge procedures on file.  ED SEPSIS DOCUMENTATION   Time reflects when diagnosis was documented in both MDM as applicable and the Disposition within this note       Time User Action Codes Description Comment    2/16/2025  1:13 PM Lola Rodriguez Add [J10.1] Influenza A     2/16/2025  1:22 PM Lola Rodriguez Add [H66.91] Right otitis media                  Lola Rodriguez PA-C  02/16/25 7683

## 2025-03-10 ENCOUNTER — HOSPITAL ENCOUNTER (OUTPATIENT)
Dept: NEUROLOGY | Facility: CLINIC | Age: 35
Discharge: HOME | End: 2025-03-10
Payer: COMMERCIAL

## 2025-03-10 DIAGNOSIS — G40.119 INTRACTABLE FOCAL EPILEPSY (HCC): ICD-10-CM

## 2025-03-10 PROCEDURE — 95816 EEG AWAKE AND DROWSY: CPT

## 2025-03-10 PROCEDURE — 95816 EEG AWAKE AND DROWSY: CPT | Performed by: PSYCHIATRY & NEUROLOGY

## 2025-03-11 ENCOUNTER — TELEPHONE (OUTPATIENT)
Dept: NEUROLOGY | Facility: CLINIC | Age: 35
End: 2025-03-11

## 2025-03-11 NOTE — TELEPHONE ENCOUNTER
Called to conf appt with  on 3/18/25 @1:00pm was not able to complete call please conf appt if pt calls back, thank you

## 2025-03-14 ENCOUNTER — TELEPHONE (OUTPATIENT)
Dept: NEUROLOGY | Facility: CLINIC | Age: 35
End: 2025-03-14

## 2025-03-14 ENCOUNTER — RESULTS FOLLOW-UP (OUTPATIENT)
Dept: NEUROLOGY | Facility: CLINIC | Age: 35
End: 2025-03-14

## 2025-03-14 NOTE — LETTER
March 14, 2025     [facility]    Patient: Irene Fernández   YOB: 1990       To Whom It May Concern:    Per phone discussion, for continuation of care purposes we would like to request the above patient's EMU study/admission from 10/9/2023-10/11/2023 to be placed on disc and mailed to the neurodiagnostics lab at [facility]. Please use mailed address as below:    [facility]  Attn: Jennifer Blum  [address]  [address]  [place] [id number]    If any questions regarding this request please reach out to Epilepsy Nurse Navigator, Maritza Abel at [phone].    Sincerely,    [facility]/Dr.Kuei Vlad Delgado MD

## 2025-03-14 NOTE — TELEPHONE ENCOUNTER
Request received from  to obtain EMU records from [facility]. Patient had study completed 10/9/2023-10/11/2023.    Call placed to [facility] neurodiagnostics, [phone]. EMU study can be obtained by submitted letter of request on letterhead to Fax: [phone].    Letter to include statement requesting study to be placed on disc and mailed (or picked up) for continuation of care. Request to also include call back number if any issues.  Mail disc to: [address] [facility] [address] [phone]         Letter generated and faxed as requested.

## 2025-03-18 ENCOUNTER — OFFICE VISIT (OUTPATIENT)
Dept: NEUROLOGY | Facility: CLINIC | Age: 35
End: 2025-03-18
Payer: COMMERCIAL

## 2025-03-18 VITALS
TEMPERATURE: 98 F | HEART RATE: 73 BPM | WEIGHT: 157.6 LBS | OXYGEN SATURATION: 98 % | SYSTOLIC BLOOD PRESSURE: 120 MMHG | RESPIRATION RATE: 14 BRPM | DIASTOLIC BLOOD PRESSURE: 70 MMHG | HEIGHT: 64 IN | BODY MASS INDEX: 26.91 KG/M2

## 2025-03-18 DIAGNOSIS — G40.119 INTRACTABLE FOCAL EPILEPSY (HCC): Primary | ICD-10-CM

## 2025-03-18 PROBLEM — G47.09 OTHER INSOMNIA: Status: ACTIVE | Noted: 2025-03-18

## 2025-03-18 PROCEDURE — 99215 OFFICE O/P EST HI 40 MIN: CPT | Performed by: PSYCHIATRY & NEUROLOGY

## 2025-03-18 RX ORDER — ZONISAMIDE 100 MG/1
CAPSULE ORAL
Qty: 150 CAPSULE | Refills: 5 | Status: SHIPPED | OUTPATIENT
Start: 2025-03-18

## 2025-03-18 RX ORDER — LAMOTRIGINE 200 MG/1
200 TABLET ORAL 2 TIMES DAILY
Qty: 60 TABLET | Refills: 5 | Status: SHIPPED | OUTPATIENT
Start: 2025-03-18

## 2025-03-18 RX ORDER — LAMOTRIGINE 100 MG/1
50 TABLET ORAL 2 TIMES DAILY
Qty: 30 TABLET | Refills: 5 | Status: SHIPPED | OUTPATIENT
Start: 2025-03-18

## 2025-03-18 RX ORDER — DIAZEPAM 5 MG/1
TABLET ORAL
Qty: 2 TABLET | Refills: 0 | Status: SHIPPED | OUTPATIENT
Start: 2025-03-18

## 2025-03-18 RX ORDER — AMOXICILLIN 500 MG/1
CAPSULE ORAL
COMMUNITY
Start: 2025-02-16 | End: 2025-03-18

## 2025-03-18 NOTE — PROGRESS NOTES
Name: Irene Fernández      : 1990      MRN: [id number]  Encounter Provider: Camille Delgado MD  Encounter Date: 3/18/2025   Encounter department: [facility]  :  Assessment & Plan  Intractable focal epilepsy (HCC)  Intractable focal epilepsy  1 - Increase dose of Zonisamide 100mg capsule to TWO tabs in the morning and THREE tabs in the evening  2 - Reduce the dose of Lamotrigine to 250mg (one 200mg tab and half a 100mg tab) twice a day  3 - take your medication earlier in the evening like about 6 PM  4 - In two weeks get blood work for CMP, zonisamide and lamotrigine  5 - CT PET brain imaging study as part of presurgical evaluation  6 - MRI brain w/wo contrast for intractable seizures    Orders:    lamoTRIgine (LaMICtal) 200 MG tablet; Take 1 tablet (200 mg total) by mouth 2 (two) times a day With half 100mg tab    zonisamide (ZONEGRAN) 100 mg capsule; Take 2 capsules (200 mg total) by mouth daily AND 3 capsules (300 mg total) every evening.    lamoTRIgine (LaMICtal) 100 mg tablet; Take 0.5 tablets (50 mg total) by mouth 2 (two) times a day With one 200mg tab    MRI brain seizure wo and w contrast; Future    NM pet brain metabolic evaluation; Future    diazepam (VALIUM) 5 mg tablet; Take 1 tab as needed for anxiety 30 minutes prior to MRI study, may repeat second dose if needed if still anxious 30 minutes later.    Lamotrigine level; Future    Zonisamide level; Future    Comprehensive metabolic panel; Future       Assessment:  Ms. Irene Fernández is a 34 y.o. woman with intractable focal epilepsy, involving right hand-head sensori-motor symptoms.  Prior EEG monitoring study at [facility], demonstrated rapid spread of electrographic seizure from either the left temporal or parasagittal region to left hemisphere or left occipital region.  This suggest the involvement of the left sensorimotor cortices such as SSMA, primary sensori-motor, or  opercular-insular regions.  She reports having dizziness and insomnia from her medications.  Lamotrigine has been ineffective as a medication, so we will try to wean her off of this medication for another one.      We are considering pre-surgical evaluation.  We will need to obtain her prior EMU Study from [facility].  We will need an updated 3T MRI brain study and PET/CT brain metabolic imaging.  She will need neuropsychological evaluation however, her primary language is Japanese and this evaluation may not be available in the local region.    Options for this patient depends on if there is a lesion that is in a surgically accessible cortex, less likely to involve eloquent cortex, and limited epileptogenic focus.  Once the data is available we will re-assess if she requires intracranial monitoring to determine if she is a candidate for lesion ablation, RNS, DBS, or VNS therapy.    Plan:   Intractable focal epilepsy  1 - Increase dose of Zonisamide 100mg capsule to TWO tabs in the morning and THREE tabs in the evening  2 - Reduce the dose of Lamotrigine to 250mg (one 200mg tab and half a 100mg tab) twice a day  3 - take your medication earlier in the evening like about 6 PM  4 - In two weeks get blood work for CMP, zonisamide and lamotrigine  5 - CT PET brain imaging study as part of presurgical evaluation  6 - MRI brain w/wo contrast for intractable seizures  7 - For MRI related anxiety diazepam 5mg tab, take one tab 15-30 minutes prior to MRI study, may repeat one additional tab if still having anxiety symptoms near time of study.  Side effects include excessive sedation, paradoxical agitation, ataxia, slurred speech, glaucoma, and respiratory depression (in the setting of multiple CNS sedating medications).  Please have someone else drive the patient to and from the MRI location.     8 - will try to obtain EMU study from [facility] for review  9 - continue with folic acid 1mg daily  10 - follow-up in 3 months with advanced  practitioner       Chief Complaint:    Chief Complaint   Patient presents with    Seizures      HPI:    Irene Fernández is a 34 y.o. right handed female here for follow-up evaluation of seizure disorder.      Interval History 3/18/2025  Sandi [id number] [employer]  When she increased her dose of zonisamide she developed dizziness and insomnia.  She had been taken lamotrigine forever but did not recall it causing her insomnia or dizziness.    Prior to the last visit, she had been having insomnia problems.    With the dizziness, she feels like she has double vision and a feeling like she is going to fall, usually associated with after taking the medication, lasting about 15 minutes.    Since her last visit, she reports that she continues to have seizures 1-2 times a day.  These are the focal seizures that affect the right arm, strange sensation in the arm, then it is stiff-dystonic, lasting 5-20 seconds, she cannot talk, she is drooling, and confused progression to whole body shaking.    There has been no convulsive seizure from sleep.  She was not aware of the need to schedule an MRI brain and CT PET brain imaging study.    She takes her evening dose of medication at 9PM, tries to sleep at 10PM but she cannot sleep until 4AM.  Then she wakes up at 7AM to get her child ready for school.  She is then awake for the rest of the day.  She denies using her phone at n  She has not been able to go to sleep for the past 3 years.    AED/side effects/compliance:  Lamotrigine 300-300  Zonisamide 200-200    Event/Seizure semiology:  Sleep related generalized tonic clonic activity (described as whole body shaking)  Focal sensori-motor impaired aware seizure (auras) - feeling over the right arm-head region, sensation out of her body, right hand is immobilized, unable to answer, confused and spacey.      Woman of childbearing age with Epilepsy:  Contraception: not currently active but would like to have more  children in the future  Folic acid supplement:  No    Prior Epilepsy History:  Intake History 2/4/2025  -300,  qHS.  She was previously seen by Nano Alberto and Dr. Jeanine Diaz at [facility] regarding seizures and headaches.  She was on lamotrigine 300-400 for seizure and nortriptyline for headaches.  She had an EMU Study which captured several events with EEG correlation.  She was started on carbamazepine.  She reported that she continued to have daily seizures (up to three per day).  There was no improvement with carbamazepine.  She was then started on zonisamide.    She described auras of right arm paresthesias, like the arm is not her arm, pulsatile sensation of the head, lasting up to one minute.  Auras can happen up to 7 times a day.    She has daily headaches, mostly at night.  Headaches are right frontal and heavy sensation on the left posterior region, daily, no nausea, no photophobia, no phonophobia.  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  She had a thymectomy in 2009.  She is not on medication.  (She had a history of difficulty swallowing, chewing, ptosis, double vision.  She was previously on Mestinon.)  She reported that she has had generalized seizures (11/2022).  She reported a history of nocturnal epilepsy since 13 years of age.  She had generalized tonic-clonic seizures.    Sosa [id number] ()  She started to have seizures when she was 13 years old.    The first time when she had a seizure, she had a strong feeling, she cried, went to sleep and then woke up she found out that she was in the hospital.  She was unconscious with a lot of movement, foaming at the mouth.  She cannot remember her first seizure from so many years ago, maybe whole body shaking.  She recalled that she was crying because she had a fight with her brother.  She says that she has two type of seizures, both  started when she was 13 years old.  The aura seizure may have stopped for a while before they returned.    1 - aura - she starts with a feeling over the right arm and right side of her head (she cannot explain it), she feels that the sensation is out of her body, she feels that her right hand is immobilized, she is unable to answer, she acts like an idiot (confused, spacey) but she is able to remember the seizure/aura.  These seizures happen about 3 times in a day or it may not happen.  These are more triggered by stress.    2 - sleep related seizure - she cannot remember what happens when she has a sleep related seizure.  Last time it happened was in 2024.  From [place] interpretation she used the term a lot of movements, but the interpretation is whole body shaking.  There is no warning to the aura type of seizure.  In the last year, the sleep related seizure happened about 3 times.    She reports that she has daily seizures.  One seizure is triggered if she is under a lot of stressed  She cannot go to [employer] because the excitement  triggers her auras.    She was previously on carbamazepine which did not work.  She believes that the lamotrigine help with the sleep related seizures but not for the aura.  She has been on zonisamide 200mg at bedtime for a couple of months but it has not reduced the frequency of her aura seizures.    She reports that she generally does not have headaches.        Special Features  Status epilepticus: No  Self Injury Seizures: No  Precipitating Factors: stress, excitement ([employer])  Post-ictal state: None    Epilepsy Risk Factors:  Abnormal pregnancy: No  Abnormal birth/: No  Abnormal Development: No  Febrile seizures, simple: No  Febrile seizures, complex: No  CNS infection: No  Intellectual disability: No  Cerebral palsy: No  Head injury (moderate/severe): No  CNS neoplasm: No  CNS malformation: No  Neurosurgical procedure: No  Stroke: No  CNS  "autoimmune disorder: No  Alcohol abuse: No  Drug abuse: No  Family history Sz/epilepsy: No    Prior AEDs:  medication Max dose Time used Reason to stop   carbamazepine      zonisamide      lamotrigine              Prior workup:  I have reviewed the MRI brain study myself  Imagin2017 - [facility]  MRI brain  Suboptimal exam due to dental braces  DWI high signal intensity in the right paramedian maribel (acute/subacute infarction)    2017 - [facility]  MRV head - no flow is seen in the left transverse sinus which may be due to hypoplasia/aplasia of the left transverse sinus with dominant right transverse sinus.    11/15/2017 - [facility]  CTA head/neck  Intracranial vertebral arteries and basilar artery are small in caliber    2/10/2022 - [facility]  MRI brain w/wo  Foci T2/FLAIR hyperintensity in the cerebral white matter    EEGs:  2017 - [facility]  Normal awake, drowsy, and asleep    2/ - [facility]   48 hours ambulatory EEG study  Normal awake and asleep EEG  Six episodes of dizziness have normal awake EEG activity    10/9-10/11/2023 - [facility]   LTM study for daily \"aura\" with severe dizziness, abnormal feeling in the right hand (like it is moving and heavy)  Normal awake and asleep EEG  Event \"30 seconds of visual disturbance, unable to focus her eyes\" - artifact  Event reported numbness of the hands and feet - 3-5 Hz semirhythmic left temporal slowing with 2.5-3 Hz spike and wave complexes in the left occipital region for 21 seconds - patient pressed the event button a minute after the electrographic seizure  Event right arm and then left arm sensation - fast activity in the left parasagittal region, then left temporal and occipital regions with sharp waves on the left occipital region evolving to 2.5 Hz spike-wave complexes.  These events are suggestive of focal aware seizures, with onset in the left parasagittal region spreading to the left posterior temporal and occipital regions (semiology - undefined sensation in the " "right arm)  There are infrequent sharp waves in the left more than right temporal region.    3/10/2025   Left posterior temporal sharp waves.    Labs:  Component      Latest Ref Rng 6/8/2024   WBC      4.31 - 10.16 Thousand/uL 7.51    RBC      3.81 - 5.12 Million/uL 4.46    Hemoglobin      11.5 - 15.4 g/dL 13.0    Hematocrit      34.8 - 46.1 % 40.8    Platelet Count      149 - 390 Thousands/uL 279    Sodium      135 - 147 mmol/L 138    Potassium      3.5 - 5.3 mmol/L 3.9    Chloride      96 - 108 mmol/L 106    Carbon Dioxide      21 - 32 mmol/L 22    ANION GAP      4 - 13 mmol/L 10    BUN      5 - 25 mg/dL 15    Creatinine      0.60 - 1.30 mg/dL 0.80    GLUCOSE      65 - 140 mg/dL 88    Calcium      8.4 - 10.2 mg/dL 9.9    AST      13 - 39 U/L 14    ALT      7 - 52 U/L 8    ALK PHOS      34 - 104 U/L 42    Total Protein      6.4 - 8.4 g/dL 7.6    Albumin      3.5 - 5.0 g/dL 4.5    Total Bilirubin      0.20 - 1.00 mg/dL 0.36    GFR, Calculated      ml/min/1.73sq m 97    TSH 3RD GENERATON      0.450 - 4.500 uIU/mL 2.112    Total CK      26 - 192 U/L 100    MAGNESIUM      1.9 - 2.7 mg/dL 1.9      General exam   /70 (BP Location: Right arm, Patient Position: Sitting, Cuff Size: Adult)   Pulse 73   Temp 98 °F (36.7 °C) (Temporal)   Resp 14   Ht 5' 4\" (1.626 m)   Wt 71.5 kg (157 lb 9.6 oz)   SpO2 98%   BMI 27.05 kg/m²    Appearance: normally developed, atraumatic and normocephalic, normally developed  Carotids:  not assessed  Cardiovascular: regular rate and rhythm and normal heart sounds  Pulmonary: clear to auscultation  HEENT: anicteric and moist mucus membranes / oral cavity   Fundoscopy: not assessed    Mental status  Orientation: alert and oriented to name, March 18, 2025, [place]  Fund of Knowledge: intact   Attention and Concentration: not assessed  Current and Remote Memory:intact  Language: spontaneous speech is normal and comprehension is intact    Cranial Nerves  CN 1: not tested  CN 2: pupils " equal round reactive to direct and consenual light   CN 3, 4, 6: EOMI, no nystagmus  CN 5:not assessed  CN 7:muscles of facial expression are symmetric  CN 8:symmetric to finger rubs bilaterally  CN 9, 10:no dysarthria present  CN 11:symmetric shoulder shrug  CN 12:tongue is midline    Motor:  Bulk, Tone: normal bulk, normal tone  Pronation: no pronator drift  Strength: Symmetric strength of the arms and legs, no lateralizing weakness   Abnormal movements: no abnormal movements are present    Sensory:  Lighttouch: intact in all limbs  Romberg:normal    Coordination:  FNF:FNF bilaterally intact  MARYSOL:intact  FFM:intact  Gait/Station:normal gait    Reflexes:  Not assessed    Past Medical/Surgical History:  Patient Active Problem List   Diagnosis    Basilar artery stenosis    Cervical radiculopathy    Dyslipidemia    Low back pain    Recurrent major depressive disorder, in full remission (HCC)    Intractable focal epilepsy (HCC)    Pterygium eye, left    Other insomnia     Past Surgical History:   Procedure Laterality Date    TOTAL THYMECTOMY       Past Psychiatric History:  Depression: No  Anxiety: No  Psychosis: No    Medications:    Current Outpatient Medications:     folic acid (FOLVITE) 1 mg tablet, Take 1 tablet (1 mg total) by mouth daily, Disp: 90 tablet, Rfl: 3    lamoTRIgine (LaMICtal) 100 mg tablet, Take 1 tablet (100 mg total) by mouth 2 (two) times a day With one 200mg tab, Disp: 60 tablet, Rfl: 5    lamoTRIgine (LaMICtal) 200 MG tablet, Take 1 tablet (200 mg total) by mouth 2 (two) times a day With one 100mg tab, Disp: 60 tablet, Rfl: 5    zonisamide (ZONEGRAN) 100 mg capsule, Take by mouth one cap in the morning and two caps at bedtime for 2 weeks, then two caps twice a day, Disp: 120 capsule, Rfl: 5    acetaminophen (TYLENOL) 325 mg tablet, Take 2 tablets (650 mg total) by mouth every 6 (six) hours as needed for mild pain or fever (Patient not taking: Reported on 3/18/2025), Disp: 30 tablet, Rfl: 0     amoxicillin (AMOXIL) 500 mg capsule, , Disp: , Rfl:     benzonatate (TESSALON PERLES) 100 mg capsule, Take 1 capsule (100 mg total) by mouth every 8 (eight) hours (Patient not taking: Reported on 3/18/2025), Disp: 21 capsule, Rfl: 0    ibuprofen (MOTRIN) 600 mg tablet, Take 1 tablet (600 mg total) by mouth every 6 (six) hours as needed for mild pain or fever (Patient not taking: Reported on 3/18/2025), Disp: 20 tablet, Rfl: 0    menthol-cetylpyridinium (CEPACOL) 3 MG lozenge, Take 1 lozenge (3 mg total) by mouth as needed for sore throat (Patient not taking: Reported on 3/18/2025), Disp: 9 lozenge, Rfl: 0    Allergies:  No Known Allergies    Family history:  History reviewed. No pertinent family history.  There is no family history of seizure, epilepsy or developmental delay.      Social History  Living situation: lives with a child  Work:  she had one year in college, she cannot work due to her seizure and dizziness  Driving:  she reports that she is not driving   reports that she has never smoked. She has never used smokeless tobacco. She reports that she does not drink alcohol and does not use drugs.    PHQ-2/9 Depression Screening    Little interest or pleasure in doing things: 1 - several days  Feeling down, depressed, or hopeless: 1 - several days  Trouble falling or staying asleep, or sleeping too much: 1 - several days  Feeling tired or having little energy: 1 - several days  Poor appetite or overeatin - several days  Feeling bad about yourself - or that you are a failure or have let yourself or your family down: 1 - several days  Trouble concentrating on things, such as reading the newspaper or watching television: 1 - several days  Moving or speaking so slowly that other people could have noticed. Or the opposite - being so fidgety or restless that you have been moving around a lot more than usual: 1 - several days  Thoughts that you would be better off dead, or of hurting yourself in some way: 0 - not  at all  PHQ-9 Score: 8  PHQ-9 Interpretation: Mild depression     Decision making was of high-complexity due to the patient's high risk condition (seizures), psychiatric and neuropsychological comorbidities, behavioral problems, memory and cognitive problems and medication side effects.      The total amount of time spent with the patient along with pre-chart and post-chart preparation was 49 minutes on the calendar day of the date of service.  This included history taking, physical exam, review of ancillary testing, counseling provided to the patient regarding diagnosis, medications, treatment, and risk management, and other communication to the patient's providers and/or family.  Start time: 1PM  End time: 1:49PM       The [place] was queried.  No red flags were identified.  The patient is low risk for abuse of the prescribed diazepam medication.  Safe to proceed with prescription.

## 2025-03-18 NOTE — PATIENT INSTRUCTIONS
Intractable focal epilepsy  1 - Increase dose of Zonisamide 100mg capsule to TWO tabs in the morning and THREE tabs in the evening  2 - Reduce the dose of Lamotrigine to 250mg (one 200mg tab and half a 100mg tab) twice a day  3 - take your medication earlier in the evening like about 6 PM  4 - In two weeks get blood work for CMP, zonisamide and lamotrigine  5 - CT PET brain imaging study as part of presurgical evaluation  6 - MRI brain w/wo contrast for intractable seizures  7 - For MRI related anxiety diazepam 5mg tab, take one tab 15-30 minutes prior to MRI study, may repeat one additional tab if still having anxiety symptoms near time of study.  Side effects include excessive sedation, paradoxical agitation, ataxia, slurred speech, glaucoma, and respiratory depression (in the setting of multiple CNS sedating medications).  Please have someone else drive the patient to and from the MRI location.     8 - will try to obtain EMU study from [facility] for review  9 - continue with folic acid 1mg daily  10 - follow-up in 3 months with advanced practitioner

## 2025-03-18 NOTE — ASSESSMENT & PLAN NOTE
Intractable focal epilepsy  1 - Increase dose of Zonisamide 100mg capsule to TWO tabs in the morning and THREE tabs in the evening  2 - Reduce the dose of Lamotrigine to 250mg (one 200mg tab and half a 100mg tab) twice a day  3 - take your medication earlier in the evening like about 6 PM  4 - In two weeks get blood work for CMP, zonisamide and lamotrigine  5 - CT PET brain imaging study as part of presurgical evaluation  6 - MRI brain w/wo contrast for intractable seizures    Orders:    lamoTRIgine (LaMICtal) 200 MG tablet; Take 1 tablet (200 mg total) by mouth 2 (two) times a day With half 100mg tab    zonisamide (ZONEGRAN) 100 mg capsule; Take 2 capsules (200 mg total) by mouth daily AND 3 capsules (300 mg total) every evening.    lamoTRIgine (LaMICtal) 100 mg tablet; Take 0.5 tablets (50 mg total) by mouth 2 (two) times a day With one 200mg tab    MRI brain seizure wo and w contrast; Future    NM pet brain metabolic evaluation; Future    diazepam (VALIUM) 5 mg tablet; Take 1 tab as needed for anxiety 30 minutes prior to MRI study, may repeat second dose if needed if still anxious 30 minutes later.    Lamotrigine level; Future    Zonisamide level; Future    Comprehensive metabolic panel; Future

## 2025-03-19 NOTE — TELEPHONE ENCOUNTER
Call placed to [facility], [phone]. Tech handling EMU request is not in today. Requesting records request be refaxed to ensure received.     Refaxed as requested to [phone]

## 2025-03-24 NOTE — TELEPHONE ENCOUNTER
Call placed to [facility], [phone]. Request has been received. Forwarded to lead tech. They will have her call back with status update.

## 2025-05-06 DIAGNOSIS — G40.119 INTRACTABLE FOCAL EPILEPSY (HCC): ICD-10-CM

## 2025-05-06 RX ORDER — LAMOTRIGINE 200 MG/1
200 TABLET ORAL 2 TIMES DAILY
Qty: 60 TABLET | Refills: 5 | Status: SHIPPED | OUTPATIENT
Start: 2025-05-06

## 2025-05-06 NOTE — TELEPHONE ENCOUNTER
Reason for call:   [x] Refill   [] Prior Auth  [] Other:     Office:   [] PCP/Provider -   [x] Specialty/Provider - Camille Delgado [facility]     Medication: Lamictal    Dose/Frequency: 200 mg     Quantity: #60    Pharmacy: [facility]   Does the patient have enough for 3 days?   [] Yes   [x] No - Send as HP to POD    Mail Away Pharmacy   Does the patient have enough for 10 days?   [] Yes   [] No - Send as HP to POD

## 2025-05-12 ENCOUNTER — HOSPITAL ENCOUNTER (OUTPATIENT)
Dept: NUCLEAR MEDICINE | Facility: HOSPITAL | Age: 35
Discharge: HOME | End: 2025-05-12
Attending: PSYCHIATRY & NEUROLOGY

## 2025-05-12 ENCOUNTER — TELEPHONE (OUTPATIENT)
Age: 35
End: 2025-05-12

## 2025-05-12 DIAGNOSIS — G40.119 INTRACTABLE FOCAL EPILEPSY (HCC): Primary | ICD-10-CM

## 2025-05-12 DIAGNOSIS — G40.119 INTRACTABLE FOCAL EPILEPSY (HCC): ICD-10-CM

## 2025-05-12 NOTE — TELEPHONE ENCOUNTER
"----- Message from Camille Delgado MD sent at 5/12/2025  3:35 PM EDT -----  Regarding: recent seizure  I got a message from radiology about a recent seizure.  She was schedule to have a PET/CT brain study but because of a seizure in the last 24 hours they had to reschedule.    \" Today, she was rescheduled because she had a seizure close to midnight yesterday 5/11- approx 12 hrs prior to today's PET scan. Per the radiologist's recommendation we need to wait 24-48 hrs post seizure to be able to proceed with a PET scan\"    Please reach out to the patient to find out what happened the night before, what type of seizure, how often are her seizures, and to tell her to get blood work that was requested from the last visit.    Clara  "

## 2025-05-16 RX ORDER — LEVETIRACETAM 500 MG/1
TABLET ORAL
Qty: 120 TABLET | Refills: 5 | Status: SHIPPED | OUTPATIENT
Start: 2025-05-16

## 2025-05-16 NOTE — TELEPHONE ENCOUNTER
I'll put in a prescription for Levetiracetam 500mg tab, take one tab twice a day for 7 days, then one and a half tab twice a day for 7 days, then take 2 tabs twice a day.

## 2025-05-16 NOTE — TELEPHONE ENCOUNTER
"Called re: below using  services.    Agent: Damon  ID #: [id number]    Spoke with pt with the assistance of Damon. She states that she had her \"Aura\" that she usually has with her typical movements and also lost time, or has no memory of it. She states that she has had increased stress lately and that might be what caused it, but denies any missed medication doses, illness/ABX, ETOH/Illicit drug use, new meds other than increase in zonisamide. She states that it last anywhere between 30-60 secs. Denies injury.    She states that she is still having these \"Auras\" almost daily and states that she does not think the medication is working.     She has rescheduled her NM PET scan for Monday and will also get her labs drawn Monday.    Please advise. Thank you.  "

## 2025-05-19 ENCOUNTER — HOSPITAL ENCOUNTER (OUTPATIENT)
Dept: NUCLEAR MEDICINE | Facility: HOSPITAL | Age: 35
Discharge: HOME | End: 2025-05-19
Attending: PSYCHIATRY & NEUROLOGY
Payer: COMMERCIAL

## 2025-05-19 LAB — GLUCOSE SERPL-MCNC: 96 MG/DL (ref 65–140)

## 2025-05-19 PROCEDURE — 82948 REAGENT STRIP/BLOOD GLUCOSE: CPT

## 2025-05-19 PROCEDURE — A9552 F18 FDG: HCPCS

## 2025-05-19 PROCEDURE — 78608 BRAIN IMAGING (PET): CPT

## 2025-06-16 ENCOUNTER — APPOINTMENT (OUTPATIENT)
Dept: LAB | Facility: HOSPITAL | Age: 35
End: 2025-06-16
Payer: COMMERCIAL

## 2025-06-16 DIAGNOSIS — G40.119 INTRACTABLE FOCAL EPILEPSY (HCC): ICD-10-CM

## 2025-06-16 LAB
ALBUMIN SERPL BCG-MCNC: 5 G/DL (ref 3.5–5)
ALP SERPL-CCNC: 46 U/L (ref 34–104)
ALT SERPL W P-5'-P-CCNC: 12 U/L (ref 7–52)
ANION GAP SERPL CALCULATED.3IONS-SCNC: 7 MMOL/L (ref 4–13)
AST SERPL W P-5'-P-CCNC: 14 U/L (ref 13–39)
BILIRUB SERPL-MCNC: 0.27 MG/DL (ref 0.2–1)
BUN SERPL-MCNC: 22 MG/DL (ref 5–25)
CALCIUM SERPL-MCNC: 10 MG/DL (ref 8.4–10.2)
CHLORIDE SERPL-SCNC: 104 MMOL/L (ref 96–108)
CO2 SERPL-SCNC: 24 MMOL/L (ref 21–32)
CREAT SERPL-MCNC: 0.86 MG/DL (ref 0.6–1.3)
GFR SERPL CREATININE-BSD FRML MDRD: 88 ML/MIN/1.73SQ M
GLUCOSE SERPL-MCNC: 71 MG/DL (ref 65–140)
POTASSIUM SERPL-SCNC: 4 MMOL/L (ref 3.5–5.3)
PROT SERPL-MCNC: 8.7 G/DL (ref 6.4–8.4)
SODIUM SERPL-SCNC: 135 MMOL/L (ref 135–147)

## 2025-06-16 PROCEDURE — 36415 COLL VENOUS BLD VENIPUNCTURE: CPT

## 2025-06-16 PROCEDURE — 80053 COMPREHEN METABOLIC PANEL: CPT

## 2025-06-16 PROCEDURE — 80203 DRUG SCREEN QUANT ZONISAMIDE: CPT

## 2025-06-16 PROCEDURE — 80175 DRUG SCREEN QUAN LAMOTRIGINE: CPT

## 2025-06-18 ENCOUNTER — OFFICE VISIT (OUTPATIENT)
Dept: NEUROLOGY | Facility: CLINIC | Age: 35
End: 2025-06-18
Payer: COMMERCIAL

## 2025-06-18 ENCOUNTER — HOSPITAL ENCOUNTER (OUTPATIENT)
Facility: MEDICAL CENTER | Age: 35
Discharge: HOME | End: 2025-06-18
Attending: PSYCHIATRY & NEUROLOGY
Payer: COMMERCIAL

## 2025-06-18 VITALS
HEART RATE: 81 BPM | HEIGHT: 64 IN | TEMPERATURE: 97.4 F | DIASTOLIC BLOOD PRESSURE: 74 MMHG | BODY MASS INDEX: 26.98 KG/M2 | WEIGHT: 158 LBS | SYSTOLIC BLOOD PRESSURE: 104 MMHG

## 2025-06-18 DIAGNOSIS — G40.119 INTRACTABLE FOCAL EPILEPSY (HCC): Primary | ICD-10-CM

## 2025-06-18 DIAGNOSIS — G40.119 INTRACTABLE FOCAL EPILEPSY (HCC): ICD-10-CM

## 2025-06-18 LAB
LAMOTRIGINE SERPL-MCNC: 5.2 UG/ML (ref 2–20)
ZONISAMIDE SERPL-MCNC: 2.9 UG/ML (ref 10–40)

## 2025-06-18 PROCEDURE — 99214 OFFICE O/P EST MOD 30 MIN: CPT | Performed by: PHYSICIAN ASSISTANT

## 2025-06-18 PROCEDURE — 70551 MRI BRAIN STEM W/O DYE: CPT

## 2025-06-18 RX ORDER — LEVETIRACETAM 750 MG/1
1500 TABLET ORAL 2 TIMES DAILY
Qty: 120 TABLET | Refills: 5 | Status: SHIPPED | OUTPATIENT
Start: 2025-06-18

## 2025-06-18 RX ORDER — NORTRIPTYLINE HYDROCHLORIDE 25 MG/1
25 CAPSULE ORAL
COMMUNITY
Start: 2025-04-09

## 2025-06-18 NOTE — PATIENT INSTRUCTIONS
Plan:  - increase levetiracetam.  Change to 750mg tablets, take 2 tablets (1500mg) twice a day  - decrease lamotrigine to 200mg twice a day.  Discontinue the 100mg tabs and take 1 200mg twice a day  - continue zonisamide 100mg capsules, take TWO capsules in the AM and THREE capsules in the PM  - in 2 weeks, get blood work done in the morning before taking anti-seizure medications that day  - continue to keep track of auras  - MRI is scheduled for later today  - return in 3 months

## 2025-06-18 NOTE — ASSESSMENT & PLAN NOTE
Ms. Irene Fernández is a 34 y.o. woman with intractable focal epilepsy, involving right hand-head sensori-motor symptoms. Prior EEG monitoring study at [facility] demonstrated rapid spread of electrographic seizure from either the left temporal or parasagittal region to left hemisphere or left occipital region. This suggests the involvement of the left sensorimotor cortices such as SSMA, primary sensori-motor, or opercular-insular regions. She reports having dizziness and insomnia from her medications. We have been trying to wean her off lamotrigine since this has been ineffective as a medication. Levetiracetam was recently added as well as increasing zonisamide, however these adjustments have not made much impact on her daily FIAS.  Recent zonisamide level was low and she admitted to missing some doses.  We discussed the importance of medication compliance in detail.    As part of considering pre-surgical evaluation, she had a recent PET scan which demonstrated nonspecific, diffuse, relatively decreased radiotracer uptake in the bilateral temporal, parietal and frontal lobes compared to the occipital lobes, making it difficult to identify a seizure focus given the large areas of relatively decreased activity.  Her brain MRI is scheduled for later this evening.    I briefly re-discussed surgical options which were previously discussed with Dr. Delgado.  Options for this patient depends on if there is a lesion that is in a surgically accessible cortex, less likely to involve eloquent cortex, and limited epileptogenic focus.  She will need to complete the workup (MRI) and then we can re-assess if she requires intracranial monitoring to determine if she is a candidate for lesion ablation, RNS, DBS, or VNS therapy. She will need neuropsychological evaluation however, her primary language is Amharic and this evaluation may not be available in the local region.     Will increase levetiracetam and further decrease her  lamotrigine today.     Plan:  - increase levetiracetam.  Change to 750mg tablets, take 2 tablets (1500mg) twice a day  - decrease lamotrigine to 200mg twice a day.  Discontinue the 100mg tabs and take 1 200mg tab twice a day  - continue zonisamide 100mg capsules, take TWO capsules in the AM and THREE capsules in the PM  - in 2 weeks, get blood work done in the morning before taking anti-seizure medications that day  - continue to keep track of auras/FIAS  - MRI is scheduled for later today  - return in 3 months   Orders:    levETIRAcetam (Keppra) 750 mg tablet; Take 2 tablets (1,500 mg total) by mouth 2 (two) times a day    Levetiracetam level; Future    Zonisamide level; Future

## 2025-06-18 NOTE — PROGRESS NOTES
Name: Irene Fernández      : 1990      MRN: [id number]  Encounter Provider: Maritza Muñoz PA-C  Encounter Date: 2025   Encounter department: [facility] [place]  :  Assessment & Plan  Intractable focal epilepsy (HCC)  Ms. Irene Fernández is a 34 y.o. woman with intractable focal epilepsy, involving right hand-head sensori-motor symptoms. Prior EEG monitoring study at [facility] demonstrated rapid spread of electrographic seizure from either the left temporal or parasagittal region to left hemisphere or left occipital region. This suggests the involvement of the left sensorimotor cortices such as SSMA, primary sensori-motor, or opercular-insular regions. She reports having dizziness and insomnia from her medications. We have been trying to wean her off lamotrigine since this has been ineffective as a medication. Levetiracetam was recently added as well as increasing zonisamide, however these adjustments have not made much impact on her daily FIAS.  Recent zonisamide level was low and she admitted to missing some doses.  We discussed the importance of medication compliance in detail.    As part of considering pre-surgical evaluation, she had a recent PET scan which demonstrated nonspecific, diffuse, relatively decreased radiotracer uptake in the bilateral temporal, parietal and frontal lobes compared to the occipital lobes, making it difficult to identify a seizure focus given the large areas of relatively decreased activity.  Her brain MRI is scheduled for later this evening.    I briefly re-discussed surgical options which were previously discussed with Dr. Delgado.  Options for this patient depends on if there is a lesion that is in a surgically accessible cortex, less likely to involve eloquent cortex, and limited epileptogenic focus.  She will need to complete the workup (MRI) and then we can re-assess if she requires intracranial monitoring to determine if she is a  candidate for lesion ablation, RNS, DBS, or VNS therapy. She will need neuropsychological evaluation however, her primary language is Kenyan and this evaluation may not be available in the local region.     Will increase levetiracetam and further decrease her lamotrigine today.     Plan:  - increase levetiracetam.  Change to 750mg tablets, take 2 tablets (1500mg) twice a day  - decrease lamotrigine to 200mg twice a day.  Discontinue the 100mg tabs and take 1 200mg tab twice a day  - continue zonisamide 100mg capsules, take TWO capsules in the AM and THREE capsules in the PM  - in 2 weeks, get blood work done in the morning before taking anti-seizure medications that day  - continue to keep track of auras/FIAS  - MRI is scheduled for later today  - return in 3 months   Orders:    levETIRAcetam (Keppra) 750 mg tablet; Take 2 tablets (1,500 mg total) by mouth 2 (two) times a day    Levetiracetam level; Future    Zonisamide level; Future          History of Present Illness   HPI   Irene Fernánedz is a 34 y.o. right handed female here for follow-up evaluation of seizure disorder.      Interval History 6/18/2025  Patient is here with her friend/neighbor today who assists with Kenyan translation.  Declined [employer] .    She was last seen by Dr. Delgado on 3/18/25.  At that time, she continued to have seizures 1-2 times a day.  These were focal seizures involving R arm, with aphasia, drooling, confusion, and progression to whole body shaking.  Dr. Delgado advised increasing zonisamide to 200mg AM, 300mg PM, reducing lamotrigine to 250mg BID.    She could not completed her PET scan when scheduled in May due to a seizure within 24hrs.  At that time, Dr. Delgado had staff reach out to her and due to ongoing seizures, Dr. Delgado advised starting levetiracetam 500mg BID x 7 days, then 750mg BID x 7 days, then 1000mg BID.    She did complete PET scan on 5/19/2025  Nonspecific diffuse relatively decreased radiotracer uptake  "in the bilateral temporal, parietal and frontal lobes compared to the occipital lobes. An epileptogenic focus would be difficult to determine given the large areas of relatively decreased   activity.  If, for example, patient was looking at phone during the uptake period, this could suggest that findings are related to physiologically increased uptake in the bilateral occipital lobes, rather than decreased uptake in the remaining regions of the brain.     Brain MRI is scheduled for later today.    She continues to have seizures 2-3 times a day which she calls \"auras\".  These are the focal seizures with R arm involvement, inability to speak, confused.  Episodes last 30-60 seconds and she loses time during the event.  She denies any generalized tonic clonic activity since 8/2024.     She did get blood work done recently on 6/16/25.  Zonisamide level low 2.9, lamotrigine level 5.2.  She says she missed \"a few\" doses of zonisamide leading up to the blood work, but cannot say how much.  She tells me that in general, she is compliant with her meds.     AED/side effects/compliance:  Lamotrigine 300-300  Zonisamide 200-200  Levetiracetam [id number]     Event/Seizure semiology:  Sleep related generalized tonic clonic activity (described as whole body shaking)  Focal sensori-motor impaired aware seizure (auras) - feeling over the right arm-head region, sensation out of her body, right hand is immobilized, unable to answer, confused and spacey.      Woman of childbearing age with Epilepsy:  Contraception: not currently active but would like to have more children in the future  Folic acid supplement: No     Prior Epilepsy History:  Intake History 2/4/2025  -300,  qHS. She was previously seen by Nano Alberto and Dr. Jeanine Diaz at [facility]-neurology regarding seizures and headaches. She was on lamotrigine 300-400 for seizure and nortriptyline for headaches. She had an EMU Study which captured several events with " EEG correlation. She was started on carbamazepine. She reported that she continued to have daily seizures (up to three per day). There was no improvement with carbamazepine. She was then started on zonisamide.   She described auras of right arm paresthesias, like the arm is not her arm, pulsatile sensation of the head, lasting up to one minute. Auras can happen up to 7 times a day.   She has daily headaches, mostly at night. Headaches are right frontal and heavy sensation on the left posterior region, daily, no nausea, no photophobia, no phonophobia.  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 She had a thymectomy in 2009. She is not on medication. (She had a history of difficulty swallowing, chewing, ptosis, double vision. She was previously on Mestinon.)  She reported that she has had generalized seizures (11/2022). She reported a history of nocturnal epilepsy since 13 years of age. She had generalized tonic-clonic seizures.     Sosa [id number] ()  She started to have seizures when she was 13 years old.   The first time when she had a seizure, she had a strong feeling, she cried, went to sleep and then woke up she found out that she was in the hospital. She was unconscious with a lot of movement, foaming at the mouth. She cannot remember her first seizure from so many years ago, maybe whole body shaking. She recalled that she was crying because she had a fight with her brother.  She says that she has two type of seizures, both started when she was 13 years old. The aura seizure may have stopped for a while before they returned.   1 - aura - she starts with a feeling over the right arm and right side of her head (she cannot explain it), she feels that the sensation is out of her body, she feels that her right hand is immobilized, she is unable to answer, she acts like an idiot (confused, spacey) but she is able to  remember the seizure/aura. These seizures happen about 3 times in a day or it may not happen. These are more triggered by stress.   2 - sleep related seizure - she cannot remember what happens when she has a sleep related seizure. Last time it happened was in August 2024. From Kenyan interpretation she used the term a lot of movements, but the interpretation is whole body shaking. There is no warning to the aura type of seizure. In the last year, the sleep related seizure happened about 3 times.   She reports that she has daily seizures.  One seizure is triggered if she is under a lot of stressed  She cannot go to [employer] [place] because the excitement triggers her auras.   She was previously on carbamazepine which did not work. She believes that the lamotrigine help with the sleep related seizures but not for the aura. She has been on zonisamide 200mg at bedtime for a couple of months but it has not reduced the frequency of her aura seizures.   She reports that she generally does not have headaches.       Interval History 3/18/2025  [address]   When she increased her dose of zonisamide she developed dizziness and insomnia. She had been taken lamotrigine forever but did not recall it causing her insomnia or dizziness.   Prior to the last visit, she had been having insomnia problems.   With the dizziness, she feels like she has double vision and a feeling like she is going to fall, usually associated with after taking the medication, lasting about 15 minutes.   Since her last visit, she reports that she continues to have seizures 1-2 times a day. These are the focal seizures that affect the right arm, strange sensation in the arm, then it is stiff-dystonic, lasting 5-20 seconds, she cannot talk, she is drooling, and confused progression to whole body shaking.   There has been no convulsive seizure from sleep.  She was not aware of the need to schedule an MRI brain and CT PET brain  imaging study.   She takes her evening dose of medication at 9PM, tries to sleep at 10PM but she cannot sleep until 4AM. Then she wakes up at 7AM to get her child ready for school. She is then awake for the rest of the day. She denies using her phone at n  She has not been able to go to sleep for the past 3 years.    Special Features  Status epilepticus: No  Self Injury Seizures: No  Precipitating Factors: stress, excitement (amus[place])  Post-ictal state: None     Epilepsy Risk Factors:  Abnormal pregnancy: No  Abnormal birth/: No  Abnormal Development: No  Febrile seizures, simple: No  Febrile seizures, complex: No  CNS infection: No  Intellectual disability: No  Cerebral palsy: No  Head injury (moderate/severe): No  CNS neoplasm: No  CNS malformation: No  Neurosurgical procedure: No  Stroke: No  CNS autoimmune disorder: No  Alcohol abuse: No  Drug abuse: No  Family history Sz/epilepsy: No     Prior AEDs:  medication Max dose Time used Reason to stop   carbamazepine         zonisamide         lamotrigine                      Prior workup:  I have reviewed the MRI brain study myself  Imagin2017 - [facility]  MRI brain  Suboptimal exam due to dental braces  DWI high signal intensity in the right paramedian maribel (acute/subacute infarction)     2017 - [facility]  MRV head - no flow is seen in the left transverse sinus which may be due to hypoplasia/aplasia of the left transverse sinus with dominant right transverse sinus.     11/15/2017 - [facility]  CTA head/neck  Intracranial vertebral arteries and basilar artery are small in caliber     2/10/2022 - [facility]  MRI brain w/wo  Foci T2/FLAIR hyperintensity in the cerebral white matter    2025   NM PET Brain  Nonspecific diffuse relatively decreased radiotracer uptake in the bilateral temporal, parietal and frontal lobes compared to the occipital lobes. An epileptogenic focus would be difficult to determine given the large areas of relatively decreased  "  activity.     EEGs:  9/21/2017 - [facility]  Normal awake, drowsy, and asleep     2/10-2/12/2021 - [facility]   48 hours ambulatory EEG study  Normal awake and asleep EEG  Six episodes of dizziness have normal awake EEG activity     10/9-10/11/2023 - [facility]   LTM study for daily \"aura\" with severe dizziness, abnormal feeling in the right hand (like it is moving and heavy)  Normal awake and asleep EEG  Event \"30 seconds of visual disturbance, unable to focus her eyes\" - artifact  Event reported numbness of the hands and feet - 3-5 Hz semirhythmic left temporal slowing with 2.5-3 Hz spike and wave complexes in the left occipital region for 21 seconds - patient pressed the event button a minute after the electrographic seizure  Event right arm and then left arm sensation - fast activity in the left parasagittal region, then left temporal and occipital regions with sharp waves on the left occipital region evolving to 2.5 Hz spike-wave complexes.  These events are suggestive of focal aware seizures, with onset in the left parasagittal region spreading to the left posterior temporal and occipital regions (semiology - undefined sensation in the right arm)  There are infrequent sharp waves in the left more than right temporal region.     3/10/2025   Left posterior temporal sharp waves.    Review of Systems   Constitutional: Negative.  Negative for fatigue and fever.   HENT: Negative.  Negative for hearing loss, tinnitus and trouble swallowing.    Eyes:  Negative for photophobia, pain and visual disturbance.   Respiratory: Negative.  Negative for cough and shortness of breath.    Cardiovascular: Negative.  Negative for chest pain and palpitations.   Gastrointestinal:  Negative for constipation, diarrhea, nausea and vomiting.   Endocrine: Negative.    Genitourinary: Negative.  Negative for difficulty urinating and urgency.   Musculoskeletal: Negative.  Negative for back pain, gait problem and neck pain.   Skin: Negative.  Negative for " "rash.   Neurological:  Positive for seizures. Negative for dizziness, tremors, syncope, speech difficulty, weakness, numbness and headaches.   Hematological: Negative.    Psychiatric/Behavioral:  Negative for decreased concentration and sleep disturbance. The patient is not nervous/anxious.         Medications Ordered Prior to Encounter[1]      Objective   /74   Pulse 81   Temp (!) 97.4 °F (36.3 °C)   Ht 5' 4\" (1.626 m)   Wt 71.7 kg (158 lb)   BMI 27.12 kg/m²     Physical Exam  Constitutional:       Appearance: Normal appearance.     Eyes:      Extraocular Movements: EOM normal.      Pupils: Pupils are equal, round, and reactive to light.       Neurological:      Mental Status: She is alert.      Motor: Motor strength is normal.    Psychiatric:         Mood and Affect: Mood normal.         Speech: Speech normal.         Behavior: Behavior normal.       Neurological Exam  Mental Status  Alert. Oriented to person, place, time and situation. Speech is normal. Language is fluent with no aphasia. Attention and concentration are normal.    Cranial Nerves  CN II: Visual fields full to confrontation.  CN III, IV, VI: Extraocular movements intact bilaterally. Pupils equal round and reactive to light bilaterally.  CN V: Facial sensation is normal.  CN VII: Full and symmetric facial movement.  CN VIII: Hearing is normal.  CN IX, X: Palate elevates symmetrically  CN XI: Shoulder shrug strength is normal.  CN XII: Tongue midline without atrophy or fasciculations.    Motor   Normal muscle tone. Strength is 5/5 throughout all four extremities.    Sensory  Light touch is normal in upper and lower extremities.     Coordination  Right: Finger-to-nose normal.Left: Finger-to-nose normal.    Gait  Casual gait is normal including stance, stride, and arm swing.           [1]   Current Outpatient Medications on File Prior to Visit   Medication Sig Dispense Refill    folic acid (FOLVITE) 1 mg tablet Take 1 tablet (1 mg total) by " mouth daily 90 tablet 3    lamoTRIgine (LaMICtal) 200 MG tablet Take 1 tablet (200 mg total) by mouth 2 (two) times a day With half 100mg tab 60 tablet 5    nortriptyline (PAMELOR) 25 mg capsule Take 25 mg by mouth daily at bedtime      zonisamide (ZONEGRAN) 100 mg capsule Take 2 capsules (200 mg total) by mouth daily AND 3 capsules (300 mg total) every evening. 150 capsule 5    diazepam (VALIUM) 5 mg tablet Take 1 tab as needed for anxiety 30 minutes prior to MRI study, may repeat second dose if needed if still anxious 30 minutes later. 2 tablet 0     No current facility-administered medications on file prior to visit.

## 2025-06-30 ENCOUNTER — HOSPITAL ENCOUNTER (EMERGENCY)
Facility: HOSPITAL | Age: 35
Discharge: HOME | End: 2025-06-30
Attending: EMERGENCY MEDICINE
Payer: COMMERCIAL

## 2025-06-30 ENCOUNTER — APPOINTMENT (EMERGENCY)
Dept: ULTRASOUND IMAGING | Facility: HOSPITAL | Age: 35
End: 2025-06-30
Payer: COMMERCIAL

## 2025-06-30 VITALS
WEIGHT: 156.53 LBS | OXYGEN SATURATION: 100 % | BODY MASS INDEX: 26.87 KG/M2 | DIASTOLIC BLOOD PRESSURE: 76 MMHG | RESPIRATION RATE: 18 BRPM | HEART RATE: 68 BPM | TEMPERATURE: 98.9 F | SYSTOLIC BLOOD PRESSURE: 112 MMHG

## 2025-06-30 DIAGNOSIS — R10.9 ABDOMINAL PAIN IN PREGNANCY: Primary | ICD-10-CM

## 2025-06-30 DIAGNOSIS — O36.80X0 PREGNANCY OF UNKNOWN ANATOMIC LOCATION: ICD-10-CM

## 2025-06-30 DIAGNOSIS — O26.899 ABDOMINAL PAIN IN PREGNANCY: Primary | ICD-10-CM

## 2025-06-30 LAB
ABO GROUP BLD: NORMAL
B-HCG SERPL-ACNC: ABNORMAL MIU/ML (ref 0–5)
BILIRUB UR QL STRIP: NEGATIVE
BLD GP AB SCN SERPL QL: NEGATIVE
CLARITY UR: CLEAR
COLOR UR: YELLOW
EXT PREGNANCY TEST URINE: POSITIVE
EXT. CONTROL: ABNORMAL
GLUCOSE UR STRIP-MCNC: NEGATIVE MG/DL
HGB UR QL STRIP.AUTO: NEGATIVE
KETONES UR STRIP-MCNC: NEGATIVE MG/DL
LEUKOCYTE ESTERASE UR QL STRIP: NEGATIVE
NITRITE UR QL STRIP: NEGATIVE
PH UR STRIP.AUTO: 6.5 [PH] (ref 4.5–8)
PROT UR STRIP-MCNC: NEGATIVE MG/DL
RH BLD: POSITIVE
SP GR UR STRIP.AUTO: 1.02 (ref 1–1.03)
SPECIMEN EXPIRATION DATE: NORMAL
UROBILINOGEN UR QL STRIP.AUTO: 0.2 E.U./DL

## 2025-06-30 PROCEDURE — 86901 BLOOD TYPING SEROLOGIC RH(D): CPT

## 2025-06-30 PROCEDURE — 86900 BLOOD TYPING SEROLOGIC ABO: CPT

## 2025-06-30 PROCEDURE — 36415 COLL VENOUS BLD VENIPUNCTURE: CPT

## 2025-06-30 PROCEDURE — 81003 URINALYSIS AUTO W/O SCOPE: CPT

## 2025-06-30 PROCEDURE — 87591 N.GONORRHOEAE DNA AMP PROB: CPT

## 2025-06-30 PROCEDURE — 86850 RBC ANTIBODY SCREEN: CPT

## 2025-06-30 PROCEDURE — 99284 EMERGENCY DEPT VISIT MOD MDM: CPT

## 2025-06-30 PROCEDURE — 81025 URINE PREGNANCY TEST: CPT

## 2025-06-30 PROCEDURE — 87491 CHLMYD TRACH DNA AMP PROBE: CPT

## 2025-06-30 PROCEDURE — 84702 CHORIONIC GONADOTROPIN TEST: CPT

## 2025-06-30 NOTE — ED PROVIDER NOTES
Time reflects when diagnosis was documented in both MDM as applicable and the Disposition within this note       Time User Action Codes Description Comment    2025  1:49 PM Lourdes Orosco Add [O26.899,  R10.9] Abdominal pain in pregnancy     2025  1:49 PM Lourdes Orosco Add [O36.80X0] Pregnancy of unknown anatomic location           ED Disposition       ED Disposition   Left from Room after Provider Exam    Condition   --    Date/Time     1:49 PM    Comment   --             Assessment & Plan       Medical Decision Making  DDx including but not limited to: threatened , ectopic pregnancy    Hcg elevated.     Pt left prior to US being performed and did not inform anyone that she was leaving.     Amount and/or Complexity of Data Reviewed  Labs: ordered. Decision-making details documented in ED Course.  Radiology: ordered.        ED Course as of 25 1350      1214 HCG QUANTITATIVE(!): 14,117.6   1317 Per RN nobody is in the role for US and they are ot answering their phone    1319 Attempted to call US- no answer   1320 Rh Factor: Positive   1346 US is coming to get pt now   1349 Informed by RN that pt left prior to US being performed       Medications - No data to display    ED Risk Strat Scores                    No data recorded        SBIRT 20yo+      Flowsheet Row Most Recent Value   Initial Alcohol Screen: US AUDIT-C     1. How often do you have a drink containing alcohol? 0 Filed at: 2025 1014   2. How many drinks containing alcohol do you have on a typical day you are drinking?  0 Filed at: 2025 1014   3b. FEMALE Any Age, or MALE 65+: How often do you have 4 or more drinks on one occassion? 0 Filed at: 2025 1014   Audit-C Score 0 Filed at: 2025 1014   [facility]: How many times in the past year have you...    Used an illegal drug or used a prescription medication for non-medical reasons? Never Filed at: 2025 1014                             History of Present Illness       Chief Complaint   Patient presents with    Abdominal Pain Pregnant     Pt requesting pregnancy test- has not had period in one month. Pt also c/o lower abd cramping- denies vaginal bleeding.        Past Medical History[1]   Past Surgical History[2]   Family History[3]   Social History[4]   E-Cigarette/Vaping    E-Cigarette Use Never User       E-Cigarette/Vaping Substances      I have reviewed and agree with the history as documented.     35 YOF presents today with complaint of abdominal pain for about 1 week.  States that she did have a positive pregnancy test at home.  Denies any vaginal bleeding or discharge.  Reports last menstrual period was in May.        Review of Systems   Gastrointestinal:  Negative for diarrhea, nausea and vomiting.   Genitourinary:  Positive for menstrual problem and pelvic pain. Negative for vaginal bleeding and vaginal discharge.           Objective       ED Triage Vitals [06/30/25 1013]   Temperature Pulse Blood Pressure Respirations SpO2 Patient Position - Orthostatic VS   98.9 °F (37.2 °C) 84 122/78 16 100 % Lying      Temp Source Heart Rate Source BP Location FiO2 (%) Pain Score    Oral Monitor Right arm -- 8      Vitals      Date and Time Temp Pulse SpO2 Resp BP Pain Score FACES Pain Rating User   06/30/25 1215 -- 68 100 % 18 112/76 -- -- LB   06/30/25 1145 -- 77 -- 16 113/76 -- -- LB   06/30/25 1115 -- 79 100 % 18 117/79 -- -- LB   06/30/25 1013 98.9 °F (37.2 °C) 84 100 % 16 122/78 8 -- LB            Physical Exam  Vitals and nursing note reviewed.   Constitutional:       General: She is not in acute distress.     Appearance: She is well-developed.   HENT:      Head: Normocephalic and atraumatic.     Eyes:      Conjunctiva/sclera: Conjunctivae normal.       Cardiovascular:      Rate and Rhythm: Normal rate and regular rhythm.      Heart sounds: No murmur heard.  Pulmonary:      Effort: Pulmonary effort is normal. No respiratory distress.       Breath sounds: Normal breath sounds.   Abdominal:      Palpations: Abdomen is soft.      Tenderness: There is abdominal tenderness.     Musculoskeletal:         General: No swelling.      Cervical back: Neck supple.     Skin:     General: Skin is warm and dry.      Capillary Refill: Capillary refill takes less than 2 seconds.     Neurological:      Mental Status: She is alert.     Psychiatric:         Mood and Affect: Mood normal.         Results Reviewed       Procedure Component Value Units Date/Time    hCG, quantitative [[id number]]  (Abnormal) Collected: 06/30/25 1101    Lab Status: Final result Specimen: Blood from Arm, Left Updated: 06/30/25 1213     HCG, Quant 14,117.6 mIU/mL     Narrative:       Expected Ranges:    HCG results between 5.0 and 25.0 mIU/mL may be indicative of early pregnancy but should be interpreted in light of the total clinical presentation.    HCG can rise to detectable levels in lor and post menopausal women (0-11.6 mIU/mL).     Approximate               Approximate HCG  Gestation age          Concentration ( mIU/mL)  _____________          ______________________   Weeks                      HCG values  0.2-1                       5-50  1-2                           2-3                         100-5000  3-4                         500-76409  4-5                         1000-51941  5-6                         48687-436272  6-8                         30086-479402  8-12                        [id number]      POCT pregnancy, urine [[id number]]  (Abnormal) Collected: 06/30/25 1031    Lab Status: Final result Updated: 06/30/25 1031     EXT Preg Test, Ur Positive     Control Valid    Chlamydia/GC amplified DNA by PCR [[id number]] Collected: 06/30/25 1028    Lab Status: In process Specimen: Urine, Other Updated: 06/30/25 1031    Urine Macroscopic, POC [[id number]] Collected: 06/30/25 1027    Lab Status: Final result Specimen: Urine Updated: 06/30/25 1029     Color, UA Yellow     Clarity,  UA Clear     pH, UA 6.5     Leukocytes, UA Negative     Nitrite, UA Negative     Protein, UA Negative mg/dl      Glucose, UA Negative mg/dl      Ketones, UA Negative mg/dl      Urobilinogen, UA 0.2 E.U./dl      Bilirubin, UA Negative     Occult Blood, UA Negative     Specific Gravity, UA 1.025    Narrative:      CLINITEK RESULT            US OB < 14 weeks single or first gestation level 1    (Results Pending)       Procedures    ED Medication and Procedure Management   Prior to Admission Medications   Prescriptions Last Dose Informant Patient Reported? Taking?   diazepam (VALIUM) 5 mg tablet   No No   Sig: Take 1 tab as needed for anxiety 30 minutes prior to MRI study, may repeat second dose if needed if still anxious 30 minutes later.   folic acid (FOLVITE) 1 mg tablet  Self No No   Sig: Take 1 tablet (1 mg total) by mouth daily   lamoTRIgine (LaMICtal) 200 MG tablet   No No   Sig: Take 1 tablet (200 mg total) by mouth 2 (two) times a day With half 100mg tab   levETIRAcetam (Keppra) 750 mg tablet   No No   Sig: Take 2 tablets (1,500 mg total) by mouth 2 (two) times a day   nortriptyline (PAMELOR) 25 mg capsule   Yes No   Sig: Take 25 mg by mouth daily at bedtime   zonisamide (ZONEGRAN) 100 mg capsule   No No   Sig: Take 2 capsules (200 mg total) by mouth daily AND 3 capsules (300 mg total) every evening.      Facility-Administered Medications: None     Patient's Medications   Discharge Prescriptions    No medications on file     No discharge procedures on file.  ED SEPSIS DOCUMENTATION   Time reflects when diagnosis was documented in both MDM as applicable and the Disposition within this note       Time User Action Codes Description Comment    6/30/2025  1:49 PM Lourdes Orosco Add [O26.899,  R10.9] Abdominal pain in pregnancy     6/30/2025  1:49 PM Lourdes Orosco Add [O36.80X0] Pregnancy of unknown anatomic location                      [1]   Past Medical History:  Diagnosis Date    Epilepsy (HCC)     Myasthenia  gravis (HCC)    [2]   Past Surgical History:  Procedure Laterality Date    TOTAL THYMECTOMY     [3] No family history on file.  [4]   Social History  Tobacco Use    Smoking status: Never    Smokeless tobacco: Never   Vaping Use    Vaping status: Never Used   Substance Use Topics    Alcohol use: No    Drug use: No        Lourdes Orosco PA-C  06/30/25 9301

## 2025-07-01 LAB
C TRACH DNA SPEC QL NAA+PROBE: NEGATIVE
N GONORRHOEA DNA SPEC QL NAA+PROBE: NEGATIVE

## 2025-07-05 ENCOUNTER — HOSPITAL ENCOUNTER (EMERGENCY)
Facility: HOSPITAL | Age: 35
Discharge: HOME | End: 2025-07-05
Attending: EMERGENCY MEDICINE | Admitting: EMERGENCY MEDICINE
Payer: COMMERCIAL

## 2025-07-05 ENCOUNTER — APPOINTMENT (EMERGENCY)
Dept: ULTRASOUND IMAGING | Facility: HOSPITAL | Age: 35
End: 2025-07-05
Payer: COMMERCIAL

## 2025-07-05 VITALS
SYSTOLIC BLOOD PRESSURE: 115 MMHG | TEMPERATURE: 97.9 F | DIASTOLIC BLOOD PRESSURE: 71 MMHG | RESPIRATION RATE: 18 BRPM | HEART RATE: 85 BPM | OXYGEN SATURATION: 99 %

## 2025-07-05 DIAGNOSIS — O36.80X0 PREGNANCY OF UNKNOWN ANATOMIC LOCATION: ICD-10-CM

## 2025-07-05 DIAGNOSIS — G40.119 INTRACTABLE FOCAL EPILEPSY (HCC): ICD-10-CM

## 2025-07-05 DIAGNOSIS — G40.919 BREAKTHROUGH SEIZURE (HCC): Primary | ICD-10-CM

## 2025-07-05 LAB
ANION GAP SERPL CALCULATED.3IONS-SCNC: 11 MMOL/L (ref 4–13)
B-HCG SERPL-ACNC: ABNORMAL MIU/ML (ref 0–5)
BASOPHILS # BLD AUTO: 0.02 THOUSANDS/ÂΜL (ref 0–0.1)
BASOPHILS NFR BLD AUTO: 0 % (ref 0–1)
BILIRUB UR QL STRIP: NEGATIVE
BUN SERPL-MCNC: 14 MG/DL (ref 5–25)
CALCIUM SERPL-MCNC: 9.4 MG/DL (ref 8.4–10.2)
CHLORIDE SERPL-SCNC: 106 MMOL/L (ref 96–108)
CLARITY UR: CLEAR
CO2 SERPL-SCNC: 18 MMOL/L (ref 21–32)
COLOR UR: YELLOW
CREAT SERPL-MCNC: 0.69 MG/DL (ref 0.6–1.3)
EOSINOPHIL # BLD AUTO: 0.05 THOUSAND/ÂΜL (ref 0–0.61)
EOSINOPHIL NFR BLD AUTO: 1 % (ref 0–6)
ERYTHROCYTE [DISTWIDTH] IN BLOOD BY AUTOMATED COUNT: 12.8 % (ref 11.6–15.1)
GFR SERPL CREATININE-BSD FRML MDRD: 113 ML/MIN/1.73SQ M
GLUCOSE SERPL-MCNC: 86 MG/DL (ref 65–140)
GLUCOSE UR STRIP-MCNC: NEGATIVE MG/DL
HCT VFR BLD AUTO: 42 % (ref 34.8–46.1)
HGB BLD-MCNC: 13.8 G/DL (ref 11.5–15.4)
HGB UR QL STRIP.AUTO: NEGATIVE
IMM GRANULOCYTES # BLD AUTO: 0.04 THOUSAND/UL (ref 0–0.2)
IMM GRANULOCYTES NFR BLD AUTO: 0 % (ref 0–2)
KETONES UR STRIP-MCNC: NEGATIVE MG/DL
LEUKOCYTE ESTERASE UR QL STRIP: NEGATIVE
LEVETIRACETAM SERPL-MCNC: <2 UG/ML (ref 12–46)
LYMPHOCYTES # BLD AUTO: 1.04 THOUSANDS/ÂΜL (ref 0.6–4.47)
LYMPHOCYTES NFR BLD AUTO: 10 % (ref 14–44)
MCH RBC QN AUTO: 29.1 PG (ref 26.8–34.3)
MCHC RBC AUTO-ENTMCNC: 32.9 G/DL (ref 31.4–37.4)
MCV RBC AUTO: 89 FL (ref 82–98)
MONOCYTES # BLD AUTO: 0.51 THOUSAND/ÂΜL (ref 0.17–1.22)
MONOCYTES NFR BLD AUTO: 5 % (ref 4–12)
NEUTROPHILS # BLD AUTO: 9.2 THOUSANDS/ÂΜL (ref 1.85–7.62)
NEUTS SEG NFR BLD AUTO: 84 % (ref 43–75)
NITRITE UR QL STRIP: NEGATIVE
NRBC BLD AUTO-RTO: 0 /100 WBCS
PH UR STRIP.AUTO: 7 [PH] (ref 4.5–8)
PLATELET # BLD AUTO: 234 THOUSANDS/UL (ref 149–390)
PMV BLD AUTO: 11.8 FL (ref 8.9–12.7)
POTASSIUM SERPL-SCNC: 4.1 MMOL/L (ref 3.5–5.3)
PROT UR STRIP-MCNC: NEGATIVE MG/DL
RBC # BLD AUTO: 4.74 MILLION/UL (ref 3.81–5.12)
SODIUM SERPL-SCNC: 135 MMOL/L (ref 135–147)
SP GR UR STRIP.AUTO: 1.01 (ref 1–1.03)
UROBILINOGEN UR QL STRIP.AUTO: 0.2 E.U./DL
WBC # BLD AUTO: 10.86 THOUSAND/UL (ref 4.31–10.16)

## 2025-07-05 PROCEDURE — 87147 CULTURE TYPE IMMUNOLOGIC: CPT

## 2025-07-05 PROCEDURE — 84702 CHORIONIC GONADOTROPIN TEST: CPT | Performed by: EMERGENCY MEDICINE

## 2025-07-05 PROCEDURE — 80203 DRUG SCREEN QUANT ZONISAMIDE: CPT | Performed by: EMERGENCY MEDICINE

## 2025-07-05 PROCEDURE — 80175 DRUG SCREEN QUAN LAMOTRIGINE: CPT | Performed by: EMERGENCY MEDICINE

## 2025-07-05 PROCEDURE — 99284 EMERGENCY DEPT VISIT MOD MDM: CPT

## 2025-07-05 PROCEDURE — 85025 COMPLETE CBC W/AUTO DIFF WBC: CPT | Performed by: EMERGENCY MEDICINE

## 2025-07-05 PROCEDURE — 80048 BASIC METABOLIC PNL TOTAL CA: CPT | Performed by: EMERGENCY MEDICINE

## 2025-07-05 PROCEDURE — 76801 OB US < 14 WKS SINGLE FETUS: CPT

## 2025-07-05 PROCEDURE — 81003 URINALYSIS AUTO W/O SCOPE: CPT

## 2025-07-05 PROCEDURE — 99285 EMERGENCY DEPT VISIT HI MDM: CPT | Performed by: EMERGENCY MEDICINE

## 2025-07-05 PROCEDURE — 87086 URINE CULTURE/COLONY COUNT: CPT

## 2025-07-05 PROCEDURE — 83520 IMMUNOASSAY QUANT NOS NONAB: CPT | Performed by: EMERGENCY MEDICINE

## 2025-07-05 PROCEDURE — 36415 COLL VENOUS BLD VENIPUNCTURE: CPT | Performed by: EMERGENCY MEDICINE

## 2025-07-05 NOTE — ED PROVIDER NOTES
Time reflects when diagnosis was documented in both MDM as applicable and the Disposition within this note       Time User Action Codes Description Comment    7/5/2025  5:20 PM Angle Vidal Add [G40.919] Breakthrough seizure (HCC)     7/5/2025  5:20 PM Angle Vidal Add [O36.80X0] Pregnancy of unknown anatomic location           ED Disposition       ED Disposition   Left from Room after Provider Exam    Condition   --    Date/Time   Sat Jul 5, 2025  5:19 PM    Comment   --             Assessment & Plan       Medical Decision Making  A 35-year-old female presents with  1.) Breakthough seizure, she reports compliance with her antiepileptics.  Will check labs for electrolyte abnormality, ARASELI and antiepileptic monitoring levels.  Will discuss with OB and neurology to ensure her antiepileptics are safe in pregnancy.  2.) Positive pregnancy test, patient agreeable to ultrasound to confirm IUP.    Amount and/or Complexity of Data Reviewed  Labs: ordered. Decision-making details documented in ED Course.  Radiology: ordered.        ED Course as of 07/05/25 2031   Sat Jul 05, 2025   1543 Spoke with Dr. Lubin OBGY[facility], reviewing pt's history, presentation and work up.  Defers anti-epileptic management to neuro, although Keppra and Lamictal are typically safe.     1552 HCG QUANTITATIVE(!): 45,272.9  Trending up   1654 Spoke with Dr. Kunz, epileptology, reviewing pt's history, presentation and work up.  Pt can continue her continue seizure regimen, risk of seizure is greater than risk of medications.   1719 Notified by RN that pt left from exam room without informing staff       Medications - No data to display    ED Risk Strat Scores                    No data recorded        SBIRT 20yo+      Flowsheet Row Most Recent Value   Initial Alcohol Screen: US AUDIT-C     1. How often do you have a drink containing alcohol? 0 Filed at: 07/05/2025 5845   2. How many drinks containing alcohol do you have on a typical day you are  drinking?  0 Filed at: 07/05/2025 1503   3a. Male UNDER 65: How often do you have five or more drinks on one occasion? 0 Filed at: 07/05/2025 1506   3b. FEMALE Any Age, or MALE 65+: How often do you have 4 or more drinks on one occassion? 0 Filed at: 07/05/2025 1508   Audit-C Score 0 Filed at: 07/05/2025 1508   [facility]: How many times in the past year have you...    Used an illegal drug or used a prescription medication for non-medical reasons? Never Filed at: 07/05/2025 1509                            History of Present Illness       Chief Complaint   Patient presents with    Seizure - Prior Hx Of     Pt arrives via ems from home after having to seizures today hx of epilepsy . Pt is compliant with medication. C/o headache. Pt reports being 5 weeks pregnant        Past Medical History[1]   Past Surgical History[2]   Family History[3]   Social History[4]   E-Cigarette/Vaping    E-Cigarette Use Never User       E-Cigarette/Vaping Substances      I have reviewed and agree with the history as documented.     A 36 yo female with past medical history of epilepsy; presents following a seizure.  Seizure was witnessed by patient's younger son, she is unable to provide any details of the seizure.  She currently denies all complaints.  She denies fever, chills, headache, chest pain, shortness of breath, abdominal pain, nausea, vomiting, diarrhea, peripheral edema and rashes.  She reports compliance with her antiepileptics which include Keppra, Lamictal and zonisamide.  Patient was also recently in the ED after having a positive pregnancy test, she left prior to her ultrasound.  She has not followed up with OB since.  Her LMP was in May.      History provided by:  Patient and medical records   used: Yes ([facility] [id number])        Review of Systems   Neurological:  Positive for seizures.   All other systems reviewed and are negative.      Objective       ED Triage Vitals [07/05/25 1352]   Temperature Pulse Blood  Pressure Respirations SpO2 Patient Position - Orthostatic VS   97.9 °F (36.6 °C) 85 115/71 18 99 % Lying      Temp src Heart Rate Source BP Location FiO2 (%) Pain Score    -- -- Right arm -- --      Vitals      Date and Time Temp Pulse SpO2 Resp BP Pain Score FACES Pain Rating User   07/05/25 1352 97.9 °F (36.6 °C) 85 99 % 18 115/71 -- -- BA            Physical Exam  General Appearance: alert and oriented, nad, non toxic appearing  Skin:  Warm, dry, intact.  No cyanosis  HEENT: Atraumatic, normocephalic.  No eye drainage.  Normal hearing.  Moist mucous membranes.    Neck: Supple, trachea midline  Cardiac: RRR; no murmurs, rub, gallops.  No pedal edema, 2+ pulses  Pulmonary: lungs CTAB; no wheezes, rales, rhonchi  Gastrointestinal: abdomen soft, nontender, nondistended; no guarding or rebound tenderness; good bowel sounds, no mass or bruits  Extremities:  No deformities.  No calf tenderness, no clubbing  Neuro:  no focal motor or sensory deficits, CN 2-12 grossly intact  Psych:  Normal mood and affect, normal judgement and insight       Results Reviewed       Procedure Component Value Units Date/Time    Urine culture [[id number]] Collected: 07/05/25 1553    Lab Status: In process Specimen: Urine, Clean Catch Updated: 07/05/25 1559    Urine Macroscopic, POC [[id number]] Collected: 07/05/25 1553    Lab Status: Final result Specimen: Urine Updated: 07/05/25 1555     Color, UA Yellow     Clarity, UA Clear     pH, UA 7.0     Leukocytes, UA Negative     Nitrite, UA Negative     Protein, UA Negative mg/dl      Glucose, UA Negative mg/dl      Ketones, UA Negative mg/dl      Urobilinogen, UA 0.2 E.U./dl      Bilirubin, UA Negative     Occult Blood, UA Negative     Specific Gravity, UA 1.015    Narrative:      CLINITEK RESULT    Quantitative hCG [[id number]]  (Abnormal) Collected: 07/05/25 1448    Lab Status: Final result Specimen: Blood from Arm, Right Updated: 07/05/25 1549     HCG, Quant 45,272.9 mIU/mL     Narrative:        Expected Ranges:    HCG results between 5.0 and 25.0 mIU/mL may be indicative of early pregnancy but should be interpreted in light of the total clinical presentation.    HCG can rise to detectable levels in lor and post menopausal women (0-11.6 mIU/mL).     Approximate               Approximate HCG  Gestation age          Concentration ( mIU/mL)  _____________          ______________________   Weeks                      HCG values  0.2-1                       5-50  1-2                           2-3                         100-5000  3-4                         500-98125  4-5                         1000-60450  5-6                         66992-753239  6-8                         35014-693585  8-12                        22774-730118      Basic metabolic panel [[id number]]  (Abnormal) Collected: 07/05/25 1448    Lab Status: Final result Specimen: Blood from Arm, Right Updated: 07/05/25 1524     Sodium 135 mmol/L      Potassium 4.1 mmol/L      Chloride 106 mmol/L      CO2 18 mmol/L      ANION GAP 11 mmol/L      BUN 14 mg/dL      Creatinine 0.69 mg/dL      Glucose 86 mg/dL      Calcium 9.4 mg/dL      eGFR 113 ml/min/1.73sq m     Narrative:      National Kid[employer] Foundation guidelines for Chronic Kidney Disease (CKD):     Stage 1 with normal or high GFR (GFR > 90 mL/min/1.73 square meters)    Stage 2 Mild CKD (GFR = 60-89 mL/min/1.73 square meters)    Stage 3A Moderate CKD (GFR = 45-59 mL/min/1.73 square meters)    Stage 3B Moderate CKD (GFR = 30-44 mL/min/1.73 square meters)    Stage 4 Severe CKD (GFR = 15-29 mL/min/1.73 square meters)    Stage 5 End Stage CKD (GFR <15 mL/min/1.73 square meters)  Note: GFR calculation is accurate only with a steady state creatinine    CBC and differential [[id number]]  (Abnormal) Collected: 07/05/25 1448    Lab Status: Final result Specimen: Blood from Arm, Right Updated: 07/05/25 1503     WBC 10.86 Thousand/uL      RBC 4.74 Million/uL      Hemoglobin 13.8 g/dL       Hematocrit 42.0 %      MCV 89 fL      MCH 29.1 pg      MCHC 32.9 g/dL      RDW 12.8 %      MPV 11.8 fL      Platelets 234 Thousands/uL      nRBC 0 /100 WBCs      Segmented % 84 %      Immature Grans % 0 %      Lymphocytes % 10 %      Monocytes % 5 %      Eosinophils Relative 1 %      Basophils Relative 0 %      Absolute Neutrophils 9.20 Thousands/µL      Absolute Immature Grans 0.04 Thousand/uL      Absolute Lymphocytes 1.04 Thousands/µL      Absolute Monocytes 0.51 Thousand/µL      Eosinophils Absolute 0.05 Thousand/µL      Basophils Absolute 0.02 Thousands/µL     Levetiracetam level [[id number]] Collected: 07/05/25 1448    Lab Status: In process Specimen: Blood from Arm, Right Updated: 07/05/25 1500    Lamotrigine level [[id number]] Collected: 07/05/25 1448    Lab Status: In process Specimen: Blood from Arm, Right Updated: 07/05/25 1500    Zonisamide level [[id number]] Collected: 07/05/25 1448    Lab Status: In process Specimen: Blood from Arm, Right Updated: 07/05/25 1500            US OB < 14 weeks with transvaginal   Final Interpretation by Cruzito Goode MD (07/05 1911)      Single live intrauterine gestation at 5 weeks 6 days (range +/- 4 days).      RADHA of 03/01/2026.         Workstation performed: [id number]             Procedures    ED Medication and Procedure Management   Prior to Admission Medications   Prescriptions Last Dose Informant Patient Reported? Taking?   diazepam (VALIUM) 5 mg tablet   No No   Sig: Take 1 tab as needed for anxiety 30 minutes prior to MRI study, may repeat second dose if needed if still anxious 30 minutes later.   folic acid (FOLVITE) 1 mg tablet  Self No No   Sig: Take 1 tablet (1 mg total) by mouth daily   lamoTRIgine (LaMICtal) 200 MG tablet   No No   Sig: Take 1 tablet (200 mg total) by mouth 2 (two) times a day With half 100mg tab   levETIRAcetam (Keppra) 750 mg tablet   No No   Sig: Take 2 tablets (1,500 mg total) by mouth 2 (two) times a day   nortriptyline (PAMELOR)  25 mg capsule   Yes No   Sig: Take 25 mg by mouth daily at bedtime   zonisamide (ZONEGRAN) 100 mg capsule   No No   Sig: Take 2 capsules (200 mg total) by mouth daily AND 3 capsules (300 mg total) every evening.      Facility-Administered Medications: None     Discharge Medication List as of 7/5/2025  5:45 PM        CONTINUE these medications which have NOT CHANGED    Details   diazepam (VALIUM) 5 mg tablet Take 1 tab as needed for anxiety 30 minutes prior to MRI study, may repeat second dose if needed if still anxious 30 minutes later., Normal      folic acid (FOLVITE) 1 mg tablet Take 1 tablet (1 mg total) by mouth daily, Starting Tue 2/4/2025, Normal      lamoTRIgine (LaMICtal) 200 MG tablet Take 1 tablet (200 mg total) by mouth 2 (two) times a day With half 100mg tab, Starting Tue 5/6/2025, Normal      levETIRAcetam (Keppra) 750 mg tablet Take 2 tablets (1,500 mg total) by mouth 2 (two) times a day, Starting Wed 6/18/2025, Normal      nortriptyline (PAMELOR) 25 mg capsule Take 25 mg by mouth daily at bedtime, Starting Wed 4/9/2025, Historical Med      zonisamide (ZONEGRAN) 100 mg capsule Multiple Dosages:Starting Tue 3/18/2025Take 2 capsules (200 mg total) by mouth daily AND 3 capsules (300 mg total) every evening., Normal           No discharge procedures on file.  ED SEPSIS DOCUMENTATION   Time reflects when diagnosis was documented in both MDM as applicable and the Disposition within this note       Time User Action Codes Description Comment    7/5/2025  5:20 PM Angle Vidal [G40.919] Breakthrough seizure (HCC)     7/5/2025  5:20 PM Angle Vidal [O36.80X0] Pregnancy of unknown anatomic location                    Angle Vidal DO  07/05/25 1420         [1]   Past Medical History:  Diagnosis Date    Epilepsy (HCC)     Myasthenia gravis (HCC)    [2]   Past Surgical History:  Procedure Laterality Date    TOTAL THYMECTOMY     [3] No family history on file.  [4]   Social History  Tobacco Use     Smoking status: Never    Smokeless tobacco: Never   Vaping Use    Vaping status: Never Used   Substance Use Topics    Alcohol use: No    Drug use: No        Angle Vidal DO  07/05/25 2039

## 2025-07-06 LAB — BACTERIA UR CULT: ABNORMAL

## 2025-07-07 LAB
LAMOTRIGINE SERPL-MCNC: <1 UG/ML (ref 2–20)
ZONISAMIDE SERPL-MCNC: 2.4 UG/ML (ref 10–40)

## 2025-07-10 RX ORDER — FOLIC ACID 1 MG/1
4 TABLET ORAL DAILY
Qty: 90 TABLET | Refills: 0 | Status: SHIPPED | OUTPATIENT
Start: 2025-07-10

## 2025-07-10 NOTE — PROGRESS NOTES
Review of chart after last weekend's ED encounter: pt left ED; I had been peripherally notified of this patient during her ED encounter.     I am increasing her folic acid dose to 4 mg daily as this is recommended with pregnancy and sending a script to her pharmacy.    I am reaching out to [facility] staff to request them to contact patient to offer/encourage follow up    Paolo Lubin MD  OB/GYN  7/10/2025 11:14 AM     Addendum 1:18 PM   [facility] staff Attempted to call patient to schedule appointment but call could not be completed.

## 2025-07-22 ENCOUNTER — TELEPHONE (OUTPATIENT)
Dept: OBGYN CLINIC | Facility: CLINIC | Age: 35
End: 2025-07-22

## 2025-07-22 NOTE — TELEPHONE ENCOUNTER
Used  services to call patient to offer sooner D&V on 7/30/25 as US tech is in office this day. No answer, no vm set up.     *ok to transfer to office if patient calls back*

## 2025-08-01 ENCOUNTER — ULTRASOUND (OUTPATIENT)
Age: 35
End: 2025-08-01

## 2025-08-01 VITALS
BODY MASS INDEX: 28 KG/M2 | HEART RATE: 82 BPM | HEIGHT: 64 IN | WEIGHT: 164 LBS | OXYGEN SATURATION: 100 % | SYSTOLIC BLOOD PRESSURE: 110 MMHG | DIASTOLIC BLOOD PRESSURE: 70 MMHG

## 2025-08-01 DIAGNOSIS — R82.71 GBS BACTERIURIA: ICD-10-CM

## 2025-08-01 DIAGNOSIS — G40.119 INTRACTABLE FOCAL EPILEPSY (HCC): ICD-10-CM

## 2025-08-01 DIAGNOSIS — Z32.01 POSITIVE PREGNANCY TEST: Primary | ICD-10-CM

## 2025-08-04 ENCOUNTER — INITIAL PRENATAL (OUTPATIENT)
Dept: OBGYN CLINIC | Facility: CLINIC | Age: 35
End: 2025-08-04
Payer: COMMERCIAL

## 2025-08-04 VITALS
BODY MASS INDEX: 28 KG/M2 | WEIGHT: 164 LBS | HEIGHT: 64 IN | SYSTOLIC BLOOD PRESSURE: 118 MMHG | DIASTOLIC BLOOD PRESSURE: 70 MMHG

## 2025-08-04 DIAGNOSIS — R82.71 GBS BACTERIURIA: ICD-10-CM

## 2025-08-04 DIAGNOSIS — G40.119 INTRACTABLE FOCAL EPILEPSY (HCC): ICD-10-CM

## 2025-08-04 DIAGNOSIS — Z13.71 SCREENING FOR GENETIC DISEASE CARRIER STATUS: ICD-10-CM

## 2025-08-04 DIAGNOSIS — Z36.89 ENCOUNTER FOR OTHER SPECIFIED ANTENATAL SCREENING: Primary | ICD-10-CM

## 2025-08-04 PROCEDURE — 99211 OFF/OP EST MAY X REQ PHY/QHP: CPT

## 2025-08-07 ENCOUNTER — TELEPHONE (OUTPATIENT)
Age: 35
End: 2025-08-07

## 2025-08-11 ENCOUNTER — APPOINTMENT (OUTPATIENT)
Dept: LAB | Facility: HOSPITAL | Age: 35
End: 2025-08-11
Payer: COMMERCIAL

## 2025-08-25 PROBLEM — Z3A.13 13 WEEKS GESTATION OF PREGNANCY: Status: ACTIVE | Noted: 2025-08-25

## 2025-08-25 PROBLEM — O99.351 SEIZURE DISORDER IN PREGNANCY, ANTEPARTUM, FIRST TRIMESTER (HCC): Status: ACTIVE | Noted: 2025-02-09

## 2025-08-25 PROBLEM — G40.909 SEIZURE DISORDER IN PREGNANCY, ANTEPARTUM, FIRST TRIMESTER (HCC): Status: ACTIVE | Noted: 2025-02-09

## 2025-08-25 PROBLEM — I63.9 ACUTE ISCHEMIC STROKE (HCC): Status: ACTIVE | Noted: 2025-08-25

## 2025-08-25 PROBLEM — O34.219 HISTORY OF CESAREAN DELIVERY, ANTEPARTUM: Status: ACTIVE | Noted: 2025-08-25
